# Patient Record
Sex: MALE | Race: WHITE
[De-identification: names, ages, dates, MRNs, and addresses within clinical notes are randomized per-mention and may not be internally consistent; named-entity substitution may affect disease eponyms.]

---

## 2018-09-25 ENCOUNTER — HOSPITAL ENCOUNTER (EMERGENCY)
Dept: HOSPITAL 25 - UCCORT | Age: 73
Discharge: HOME | End: 2018-09-25
Payer: COMMERCIAL

## 2018-09-25 VITALS — SYSTOLIC BLOOD PRESSURE: 117 MMHG | DIASTOLIC BLOOD PRESSURE: 72 MMHG

## 2018-09-25 DIAGNOSIS — Z96.641: ICD-10-CM

## 2018-09-25 DIAGNOSIS — Z88.8: ICD-10-CM

## 2018-09-25 DIAGNOSIS — Z79.82: ICD-10-CM
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
## 2018-09-25 DIAGNOSIS — S01.112A: Primary | ICD-10-CM

## 2018-09-25 DIAGNOSIS — Z79.2: ICD-10-CM

## 2018-09-25 DIAGNOSIS — Z79.84: ICD-10-CM

## 2018-09-25 DIAGNOSIS — M10.9: ICD-10-CM

## 2018-09-25 DIAGNOSIS — Z87.39: ICD-10-CM

## 2018-09-25 DIAGNOSIS — I10: ICD-10-CM

## 2018-09-25 DIAGNOSIS — E11.9: ICD-10-CM

## 2018-09-25 PROCEDURE — 12001 RPR S/N/AX/GEN/TRNK 2.5CM/<: CPT

## 2018-09-25 PROCEDURE — 99211 OFF/OP EST MAY X REQ PHY/QHP: CPT

## 2018-09-25 PROCEDURE — 12011 RPR F/E/E/N/L/M 2.5 CM/<: CPT

## 2018-09-25 PROCEDURE — G0463 HOSPITAL OUTPT CLINIC VISIT: HCPCS

## 2018-09-25 NOTE — XMS REPORT
Continuity of Care Document (CCD)

 Created on:2018



Patient:Jesse Colon

Sex:Male

:1945

External Reference #:[id number]





Demographics







 Address  [address] [place] [id number]

 

 Home Phone  [phone]

 

 Mobile Phone  [phone]

 

 Preferred Language  en

 

 Marital Status  Not  or 

 

 Church Affiliation  Unknown

 

 Race  White

 

 Ethnic Group  Not  or 









Author







 Name  Abisai Glover M.D.

 

 Address  [address] [place] [address]









Care Team Providers







 Name  Role  Phone

 

 Abisai Glover M.D.  Care Team Information   Unavailable









Payers







 Type  Date  Identification Numbers  Payment Provider  Subscriber

 

   Effective:  Policy Number: [id number]  [employer]'s [employer]  Jesse Colon



   2017    [employer]  









 Group Name: Medicare  [address] [address]

 

 PayID: [id number]  [place] [address]







Advance Directives







 Description

 

 No Information Available







Problems







 Date  Description  Provider  Status

 

 Onset: 2011  Type 2 diabetes mellitus  Abisai Glover M.D.  Active

 

 Onset: 2011  Essential hypertension  Abisai Glover M.D.  Active

 

 Onset: 2011  Climacteric arthritis of multiple  Abisai Glover M.D.
  Active



   sites    

 

 Onset: 2017  Gout  Abisai Glover M.D.  Active

 

 Onset: 2017  Cellulitis of left lower limb  Abisai Glover M.D.  
Active

 

 Onset: 2017  Major depressive disorder, single  Abisai Glover M.D.
  Active



   episode, unspecified    

 

 Onset: 2017  Loss of appetite  Abisai Glover M.D.  Active

 

 Onset: 2017  Incomplete rotator cuff tear or  Abisai Glover M.D.  
Active



   rupture of right shoulder, not    



   specified as traumatic    

 

 Onset: 2017  Anemia  Abisai Glover M.D.  Active

 

 Onset: 2017  Hyperlipidemia  Abisai Glover M.D.  Active

 

 Onset: 2017  Arthralgia of the pelvic region and  Abisai Glover M.D.  Active



   thigh    







Family History







 Date  Family Member(s)  Problem(s)  Comments

 

   Father   due to Premature Heart Attack  ()

 

   Father   due to Heart Disease  ()

 

   Mother   due to Heart Disease  ()

 

   Grandfather  Diabetes  







Social History







 Type  Date  Description  Comments

 

 Birth Sex    Unknown  

 

 Marital Status    Legal Status:   

 

 Lives With    Spouse  

 

 ETOH Use    Denies alcohol use  

 

 Tobacco Use  Reviewed: 17  Patient has never smoked  

 

 Smoking Status  Reviewed: 18  Patient has never smoked  







Allergies, Adverse Reactions, Alerts







 Date  Description  Reaction  Status  Severity  Comments

 

 2018  Heparin  Urticaria  Active  Severe  

 

 2011  NKDA    Inactive    







Medications







 Medication  Date  Status  Form  Strength  Qnty  SIG  Indications  Ordering



                 Provider

 

 Shingrix    Active  Suspension  50mcg  1unit  ok to have  B02.9      Rec    s  immunization    Chanellasejal



             at pharmacy    JOSE LUIS york



             and repeat    



             in 6 months    

 

 Famciclovir  05/10  Active  Tablets  500mg  14tab  1 by mouth  B02.9          s  every 12    Chanellasejal



             hours    JOSE LUIS york

 

 Allopurinol    Active  Tablets  100mg  30tab  1 by mouth  E79.0          s  every day    Calos york M.D.

 

 Metformin HCL    Active  Tablets  500mg  90tab  1 by mouth  E11.9          s  daily    Calos york M.D.

 

 Sertraline HCL    Active  Tablets  50mg  30tab  1 by mouth  F32.9          s  every day    Calos york M.D.

 

 Probiotic    Active  Capsules      use as  M71.051  Abisai



 Advanced Ultra  /2017          directed    Calos york M.D.

 

 Tylenol  05/10  Active  Tablets  325mg    2 T as q4-6              prn    Calos york M.D.

 

 Dicloxacillin  10  Active  Capsules  250mg  60cap  1 twice a    Abisai



 Sodium  /2017        s  day    Calos york M.D.

 

 Colace  02/15  Active  Capsules  100mg  90cap  3 daily  K59.00          s      Calos york M.D.

 

 Freestyle Lite    Active  Strips    100un  use two    Abisai



 Test  /2016        its  times a day    Castellasejal



             daily and as    JOSE LUIS york



             needed, dx    



             250.00, dm    

 

 Aspir-81    Active  Tablets DR  81mg    1 po qd                  Calos york M.D.

 

 Atenolol    Active  Tablets  50mg  90tab  1 tab by  I10          s  mouth every    Castellan



             day    os, M.D.

 

 Uloric    Active  Tablets  80mg    Take One    Unknown



   /0000          Tablet By    



             Mouth Every    



             Day    

 

                 

 

 Clotrimazole    Hx  Cream  1%  30gm  use 3 times  B35.4            a day    Castellan



   -              os, M.D.



                 

 

 Hydrocortisone    Hx  Cream  1%  28.40  use tid with  B35.4  Abisai



 Plus  /2018        0gm  clotrimazole    Castellan



   -              os, M.D.



                 

 

 Uloric    Hx  Tablets  80mg  30tab  1 by mouth  M10.9          s  every day    Castellan



   -              os, M.D.



                 

 

 Citalopram  08/10  Hx  Tablets  10mg    1 by mouth    



 Celinabromid          every day    Castellan



   -              os, M.D.



                 

 

 Allopurinol    Hx  Tablets  100mg  30tab  1 by mouth            s  every day    Castellan



   -              os, M.D.



                 

 

 Cipro    Hx  Tablets  500mg  20tab  1 by mouth            s  twice a day    Castellan



   -              os, M.D.



                 

 

 Colcrys    Hx  Tablets  0.6mg  30tab  2 now 1 in 1            s  hour then1    Castellan



   -          once a day    os, M.D.



                 

 

 Megestrol    Hx  Tablets  40mg  30tab  1 by mouth  R63.0  Abisai



 Acetate  /2017        s  every day    Castellan



   -              os, M.D.



   08/10              



   /2017              

 

 Mirtazapine    Hx  Tablets  7.5mg  30tab  1 every in  R63.0          s  the morning    Castellan



   -              os, M.D.



                 

 

 Citalopram  05/10  Hx  Tablets  20mg  30tab  1 by mouth    



 Hydrobromide          s  every day    Castellan



   -              os, M.D.



   08/10              



   /2017              

 

 Tramadol HCL  15  Hx  Tablets  50mg  120ta  1 by mouth  M25.551          bs  every 4 hrs    Castellan



   -          if needed    os, JONATHANDRose Mary



   08/10          for pain,    



             with one    



             tylenol each    



             time    

 

 Azithromycin    Hx  Tablets  250mg  6tabs  2 now and 1  J01.          daily x 4    Castellan



   -          days repeat    os, MRose MaryDRose Mary



             the           in 5 days    

 

 Alkalol    Hx  Solution    16Oz  use four  J01.          times a day    Castellan



   -          with    os, MNANETTE



             applicator    



   /              

 

 Meloxicam    Hx  Tablets  7.5mg  30tab  1 by mouth  M25.511          s  every day    Castellan



   -              os, M.DRose Mary



                 

 

 Meloxicam    Hx  Tablets  15mg  30tab  1 by mouth  M25.511          s  every day    Castellan



   -              os, MRose MaryDRose Mary



                 

 

 Metformin HCL    Hx  Tablets  1000mg  180ta  1/2 daily  E11.9          bs      Castellan



   -              os, M.D.



                 

 

 Glipizide XL    Hx  Tablets ER  10mg  90tab  1 by mouth  E11.    24HR    s  every day    Castellan



   -              os, JONATHANDRose Mary



   05/10              



   /2017              

 

 Lisinopril-Hydr    Hx  Tablets  20-25mg  90tab  take one  I10  



 ochlorothiazide          s  tablet by    Castellan



   -          mouth daily.    os, MRose MaryDRose Mary



   05/10              



   /2018              









 E11.9









 Janumet  2014 -  Hx  Tablets  50-1000mg  60tabs  1 bid  250.00  Abisai



   2015              JOSE LUIS Glover  2014 -  Hx        ok to    Abisai



   2014          receive    Maverick Glover    



             vaccine    

 

 Glucose Test  2014 -  Hx      100units  use twice  E11.9  Abisai



 Strips  2018          a day and    Belen



             as needed    M.D.

 

 Glucometer,  2014 -  Hx      1units  use daily    Abisai



 Generic  2014              JOSE LUIS Glover

 

 Onetouch Ultra  2013 -  Hx          250.00  Abisai



 Blue  2014              JOSE LUIS Glover

 

 Atorvastatin  05/15/2013 -  Hx  Tablets  10mg  45tabs  1 every  272.4  Abisai



 Calcium  2015          other day    JOSE LUIS Glover

 

 Glucotrol XL  2012 -  Hx  Tablets  5mg  90tabs  Stop This  E11.9  Abisai



   2017    ER 24HR      Eliot Glover M.D.

 

 Lisinopril/Hyd  2011 -  Hx  Tablets  20-25mg  90tabs  take one  401.9  
Abisai



 rochlorothiazi  2015          tablet by    Belen



 de            mouth    M.DRose Mary



             daily.    









 250.00









 Metformin HCL  2011 -  Hx  Tablets  500mg  180tabs  1 tab by    Abisai



   2011          mouth twice    Belen,



             a day    M.DRose Mary

 

 Metformin HCL  2011 -  Hx  Tablets  1000mg  180tabs  Stop This  25  
Abisai



   2015          Medicine  0.  Glover,



               00  M.D.

 

 Onetouch Ultra  2011 -  Hx  Strips    100units  use as  E1  Abisai Arzate  2016          directed to  1eliezer Bowen two  9  M.D.



             times a day    

 

 Cyclobenzaprine   -  Hx  Tablets  5mg        Unknown



 HCL  08/10/2017              

 

 Naproxen   -  Hx  Tablets  500mg        Unknown



   05/10/2017              

 

 Naproxen   -  Hx  Tablets  500mg    Take One    Unknown



   08/10/2017          Tablet By    



             Mouth Twice    



             A Day With    



             Meals    







Immunizations







 CPT Code  Status  Date  Vaccine  Reaction  Lot #

 

 00704  Given  2018  Prevnar 13    Prevnar 13 [id number]

 

 26575  Given  2015  Flu High Dose    Hi/dose/flu



       Vaccine    [id number]

 

 65208  Given  10/31/2014  Flu Triv Old Code    

 

 72016  Given  2013  Flu High Dose  risk & benefits  High doseflu



       Vaccine  discussed  [id number]

 

 19331  Given  2012  Flu High Dose    High dose [id number]



       Vaccine    

 

 78936  Given  2010  Pneumovax PPSV-23    

 

 39322  Given  2010  Tdap Vaccine over 7    



       yrs old    







Vital Signs







 Date  Vital  Result  Comment

 

 2018 10:25am  Weight  174.00 lb  









 BP Systolic  120 mmHg  

 

 BP Diastolic  72 mmHg  

 

 Height  70 inches  5'10"

 

 Heart Rate  52 /min  

 

 O2 % BldC Oximetry  99 %  ra

 

 Respiratory Rate  16 /min  

 

 BMI (Body Mass Index)  25.0 kg/m2  









 2018  7:58am  Weight  170.00 lb  









 BP Systolic  128 mmHg  

 

 BP Diastolic  82 mmHg  









 05/10/2018  1:57pm  Weight  175.00 lb  w boots









 BP Systolic  140 mmHg  

 

 BP Diastolic  80 mmHg  









 2018  8:39am  Weight  176.00 lb  









 BP Systolic  150 mmHg  

 

 BP Diastolic  70 mmHg  









 2018  2:59pm  Weight  165.00 lb  









 BP Systolic  112 mmHg  

 

 BP Diastolic  74 mmHg  

 

 Height  72 inches  6'0"

 

 BMI (Body Mass Index)  22.4 kg/m2  









 2017 10:42am  Weight  162.00 lb  









 BP Systolic  118 mmHg  

 

 BP Diastolic  84 mmHg  









 2017  8:53am  Weight  160.00 lb  sneakers









 BP Systolic  120 mmHg  

 

 BP Diastolic  78 mmHg  

 

 Height  72 inches  6'0"

 

 Heart Rate  56 /min  

 

 Respiratory Rate  16 /min  

 

 BMI (Body Mass Index)  21.7 kg/m2  









 2017 10:25am  Weight  153.50 lb  









 BP Systolic  122 mmHg  

 

 BP Diastolic  82 mmHg  









 08/10/2017 11:53am  Weight  152.00 lb  









 BP Systolic  110 mmHg  

 

 BP Diastolic  82 mmHg  









 2017  9:28am  BP Systolic  110 mmHg  









 BP Diastolic  70 mmHg  









 2017 10:59am  Weight  150.00 lb  









 BP Systolic  120 mmHg  

 

 BP Diastolic  76 mmHg  

 

 Height  72 inches  6'0"

 

 BMI (Body Mass Index)  20.3 kg/m2  









 2017  8:14am  Weight  150.00 lb  









 BP Systolic  112 mmHg  

 

 BP Diastolic  80 mmHg  









 2017 11:34am  Weight  149.50 lb  









 BP Systolic  118 mmHg  

 

 BP Diastolic  70 mmHg  









 2017 10:46am  Weight  150.00 lb  









 Height  72 inches  6'0"

 

 BMI (Body Mass Index)  20.3 kg/m2  









 2017  4:47pm  Weight  150.00 lb  









 BP Systolic  88 mmHg  

 

 BP Diastolic  66 mmHg  









 05/10/2017  3:55pm  BP Systolic  122 mmHg  









 BP Diastolic  80 mmHg  

 

 Body Temperature  98.7 F  









 02/15/2017 11:25am  BP Systolic  90 mmHg  









 BP Diastolic  60 mmHg  









 2016  8:09am  Weight  202.00 lb  









 BP Systolic  116 mmHg  

 

 BP Diastolic  70 mmHg  









 2016  9:05am  Weight  195.00 lb  









 BP Systolic  90 mmHg  

 

 BP Diastolic  70 mmHg  

 

 Height  69.75 inches  5'9.75"

 

 Heart Rate  68 /min  

 

 Body Temperature  97.4 F  

 

 Respiratory Rate  16 /min  

 

 BMI (Body Mass Index)  28.2 kg/m2  









 2016 11:01am  Weight  200.00 lb  









 BP Systolic  96 mmHg  

 

 BP Diastolic  70 mmHg  









 2016  8:57am  Weight  201.00 lb  









 BP Systolic  110 mmHg  

 

 BP Diastolic  86 mmHg  









 2015  8:52am  Weight  200.50 lb  









 BP Systolic  122 mmHg  

 

 BP Diastolic  78 mmHg  









 2015  9:02am  Weight  191.00 lb  









 BP Systolic  124 mmHg  

 

 BP Diastolic  70 mmHg  

 

 Height  69.50 inches  5'9.50"

 

 Heart Rate  60 /min  

 

 Body Temperature  97.5 F  

 

 O2 Saturation Level with Exercise  16 %  

 

 BMI (Body Mass Index)  27.8 kg/m2  









 2015  9:36am  Weight  193.00 lb  









 BP Systolic  106 mmHg  

 

 BP Diastolic  72 mmHg  









 2015  9:05am  Weight  193.50 lb  









 BP Systolic  106 mmHg  

 

 BP Diastolic  80 mmHg  









 2015 11:56am  Weight  196.50 lb  









 BP Systolic  100 mmHg  

 

 BP Diastolic  62 mmHg  









 2014 11:23am  Weight  196.00 lb  









 BP Systolic  108 mmHg  

 

 BP Diastolic  74 mmHg  









 2014  9:56am  Weight  192.00 lb  









 BP Systolic  110 mmHg  

 

 BP Diastolic  82 mmHg  

 

 Height  69.25 inches  5'9.25"

 

 Heart Rate  60 /min  

 

 Body Temperature  97.7 F  

 

 Respiratory Rate  16 /min  

 

 BMI (Body Mass Index)  28.1 kg/m2  









 2014  2:31pm  Weight  197.00 lb  









 BP Systolic  110 mmHg  

 

 BP Diastolic  70 mmHg  









 2013 12:02pm  Weight  201.50 lb  









 BP Systolic  96 mmHg  

 

 BP Diastolic  62 mmHg  









 2013  8:13am  Weight  196.00 lb  









 BP Systolic  102 mmHg  

 

 BP Diastolic  70 mmHg  

 

 Height  69.75 inches  5'9.75"

 

 Heart Rate  60 /min  

 

 Body Temperature  97.7 F  

 

 Respiratory Rate  16 /min  

 

 BMI (Body Mass Index)  28.3 kg/m2  









 05/15/2013  8:13am  Weight  196.50 lb  









 BP Systolic  120 mmHg  

 

 BP Diastolic  88 mmHg  

 

 Height  69 inches  5'9"

 

 BMI (Body Mass Index)  29.0 kg/m2  









 03/15/2013  8:08am  Weight  200.50 lb  









 BP Systolic  102 mmHg  

 

 BP Diastolic  72 mmHg  

 

 Height  69 inches  5'9"

 

 BMI (Body Mass Index)  29.6 kg/m2  









 2012  8:50am  Weight  194.00 lb  









 BP Systolic  120 mmHg  

 

 BP Diastolic  70 mmHg  

 

 Height  69 inches  5'9"

 

 BMI (Body Mass Index)  28.6 kg/m2  









 2012 11:04am  Weight  190.00 lb  









 BP Systolic  110 mmHg  

 

 BP Diastolic  64 mmHg  









 2012  8:31am  Weight  187.00 lb  









 BP Systolic  110 mmHg  

 

 BP Diastolic  70 mmHg  

 

 Height  69 inches  5'9"

 

 Heart Rate  56 /min  

 

 Body Temperature  97.7 F  

 

 Respiratory Rate  16 /min  

 

 BMI (Body Mass Index)  27.6 kg/m2  









 2012  8:18am  Weight  185.00 lb  









 BP Systolic  132 mmHg  

 

 BP Diastolic  84 mmHg  

 

 Height  69 inches  5'9"

 

 BMI (Body Mass Index)  27.3 kg/m2  









 2011  8:36am  Weight  197.00 lb  









 BP Systolic  130 mmHg  

 

 BP Diastolic  80 mmHg  







Results







 Test  Date  Facility  Test  Result  H/L  Range  Note

 

 Comprehensive  2018  [facility]  Glucose  148 mg/dL  High    
1



 Metabolic Panel    [phone]          









 BUN  27 mg/dL  High  7-18  

 

 Creatinine  1.5 mg/dL  High  0.6-1.3  

 

 Glom Filtration Rate, Estimate  49 mL/min    >60  

 

 If   59 mL/min    >60  2

 

 BUN/Creat  18.0 ratio      

 

 Sodium  139 mmol/L    136-145  

 

 Potassium  4.2 mmol/L    3.5-5.1  

 

 Chloride  107 mmol/L      

 

 Carbon Dioxide  23 mmol/L    21-32  

 

 Anion Gap  9 mEq/L    8-16  

 

 Calcium  9.5 mg/dL    8.5-10.1  

 

 Total Protein  7.9 g/dL    6.4-8.2  

 

 Albumin  3.7 g/dL    3.4-5.0  

 

 Globulin  4.2 g/dL    1.9-4.3  

 

 Alb/Glob  0.9 ratio      

 

 Bilirubin,Total  0.5 mg/dL    0.2-1.0  

 

 Sgot/Ast  12 U/L  Low  15-37  3

 

 SGPT/Alt  25 U/L    12-78  

 

 Alkaline Phosphatase  132 U/L  High    









 Microalbumin,Random  2018  [facility]  Microalbumin,Urine  11.7    
<  



 Urine    [phone]    mg/L    20.0  

 

 Glycohemoglobin A1c  2018  [facility]  Glycohemoglobin  6.9 %  
High  4.2-6  4



     [phone]  (A1c)      .3  









 eAG  151 mg/dL      









 CBC  2018  [facility]  White Blood Count  6.5 K/uL    3.4-10.5  



     [phone]          









 Red Blood Count  3.83 M/uL  Low  4.20-5.80  

 

 Hemoglobin  12.6 gm/dL  Low  12.8-17.0  

 

 Hematocrit  37.0 %  Low  38.0-48.0  

 

 Mean Cell Volume  96.6 fl  High  80.0-96.0  

 

 Mean Corpuscular HGB  32.9 pg    27.0-33.0  

 

 Mean Corpuscular HGB Conc  34.1 g/dL    31.7-36.0  

 

 Platelet Count  187 K/uL    155-360  

 

 Red Cell Distri Width %CV  12.9 %    11.6-15.8  

 

 Mean Platelet Volume  10.8 fL  High  6.6-10.6  









 Laboratory test finding  2018  [facility]  Uric Acid  6.6 mg/dL    
3.5-7.2  



     [phone]          









 Vitamin D,25-Hydroxy  <pending>      

 

 C-Reactive Protein,Cardiac  14.60 mg/L    <3.0  

 

 Sedimentation Rate  29 mm/hr  High  0-20  5









 Glycohemoglobin  2018  [facility]  Glycohemoglobin  6.8 %  High  
4.2-6.3  6, 7



 A1c    [phone]  (A1c)        









 eAG  148 mg/dL      









 Comprehensive Metabolic  2018  [facility]  Glucose  145 mg/dL  
High    



 Panel    [phone]          









 BUN  27 mg/dL  High  7-18  

 

 Creatinine  1.5 mg/dL  High  0.6-1.3  

 

 Glom Filtration Rate, Estimate  49 mL/min    >60  

 

 If   59 mL/min    >60  8

 

 BUN/Creat  18.0 ratio      

 

 Sodium  140 mmol/L    136-145  

 

 Potassium  4.7 mmol/L    3.5-5.1  

 

 Chloride  106 mmol/L      

 

 Carbon Dioxide  25 mmol/L    21-32  

 

 Anion Gap  9 mEq/L    8-16  

 

 Calcium  9.4 mg/dL    8.5-10.1  

 

 Total Protein  7.7 g/dL    6.4-8.2  

 

 Albumin  3.8 g/dL    3.4-5.0  

 

 Globulin  3.9 g/dL    1.9-4.3  

 

 Alb/Glob  1.0 ratio      

 

 Bilirubin,Total  0.7 mg/dL    0.2-1.0  

 

 Sgot/Ast  10 U/L  Low  15-37  9

 

 SGPT/Alt  23 U/L    12-78  

 

 Alkaline Phosphatase  91 U/L      









 Laboratory test  2018  [facility]  Uric Acid  6.5 mg/dL    3.5-7.2
  



 finding    [phone]          

 

 CBS W/Automated  2018  [facility]  White Blood  5.5 K/uL    3.4-
10.5  10



 Diff    [phone]  Count        









 Red Blood Count  4.01 M/uL  Low  4.20-5.80  

 

 Hemoglobin  13.3 gm/dL    12.8-17.0  

 

 Hematocrit  39.3 %    38.0-48.0  

 

 Mean Cell Volume  98.0 fl  High  80.0-96.0  

 

 Mean Corpuscular HGB  33.2 pg  High  27.0-33.0  

 

 Mean Corpuscular HGB Conc  33.8 g/dL    31.7-36.0  

 

 Platelet Count  154 K/uL  Low  155-360  

 

 Red Cell Distri Width SD  46.1 fl    36-51  

 

 Red Cell Distri Width %CV  13.2 %    11.6-15.8  

 

 Mean Platelet Volume  10.9 fL  High  6.6-10.6  

 

 Neut%  56.3 %    33.0-73.0  

 

 Lymph %  27.3 %    20.0-42.0  

 

 Mono %  8.2 %    0.0-10.0  

 

 Eo%  7.5 %  High  0.0-6.6  

 

 Bas%  0.7 %    0.0-1.1  

 

 Neut#  3.09 K/uL    1.8-7.0  

 

 Lymph #  1.50 K/uL    1.0-4.0  

 

 Mono #  0.45 K/uL    0.0-0.8  

 

 Eos #  0.41 K/uL    0.0-0.5  

 

 Baso #  0.04 K/uL    0.0-0.1  









 Laboratory test  2018  [facility]  Sedimentation  13 mm/hr    0-20
  11



 finding    [phone]  Rate        

 

 Comprehensive  2018  [facility]  Glucose  142  High    



 Metabolic Panel    [phone]    mg/dL      









 BUN  28 mg/dL  High  7-18  

 

 Creatinine  1.6 mg/dL  High  0.6-1.3  

 

 Glom Filtration Rate, Estimate  45 mL/min    >60  

 

 If   55 mL/min    >60  12

 

 BUN/Creat  17.5 ratio      

 

 Sodium  141 mmol/L    136-145  

 

 Potassium  4.5 mmol/L    3.5-5.1  

 

 Chloride  108 mmol/L  High    

 

 Carbon Dioxide  26 mmol/L    21-32  

 

 Anion Gap  7 mEq/L  Low  8-16  

 

 Calcium  9.6 mg/dL    8.5-10.1  

 

 Total Protein  7.9 g/dL    6.4-8.2  

 

 Albumin  4.0 g/dL    3.4-5.0  

 

 Globulin  3.9 g/dL    1.9-4.3  

 

 Alb/Glob  1.0 ratio      

 

 Bilirubin,Total  0.4 mg/dL    0.2-1.0  

 

 Sgot/Ast  11 U/L  Low  15-37  13

 

 SGPT/Alt  18 U/L    12-78  

 

 Alkaline Phosphatase  99 U/L      









 Laboratory test  2018  [facility]  C-Reactive  4.91    <3.0  



 finding    [phone]  Protein,Cardiac  mg/L      

 

 Laboratory test  2018  [facility]  Uric Acid  8.0  High  3.5-7.  14
,



 finding    [phone]    mg/dL    2  15

 

 Glycohemoglobin  2018  [facility]  Glycohemoglobin  6.4 %  High  
4.2-6.  16



 A1c    [phone]  (A1c)      3  









 eAG  137 mg/dL      









 Microalbumin,Random  2018  [facility]  Microalbumin,Urine  17.9    
< 20.0  



 Urine    [phone]    mg/L      

 

 Comprehensive  2018  [facility]  Glucose  161  High    



 Metabolic Panel    [phone]    mg/dL      









 BUN  31 mg/dL  High  7-18  

 

 Creatinine  1.6 mg/dL  High  0.6-1.3  

 

 Glom Filtration Rate, Estimate  45 mL/min    >60  

 

 If   55 mL/min    >60  17

 

 BUN/Creat  19.3 ratio      

 

 Sodium  138 mmol/L    136-145  

 

 Potassium  4.4 mmol/L    3.5-5.1  

 

 Chloride  106 mmol/L      

 

 Carbon Dioxide  26 mmol/L    21-32  

 

 Anion Gap  6 mEq/L  Low  8-16  

 

 Calcium  9.8 mg/dL    8.5-10.1  

 

 Total Protein  8.0 g/dL    6.4-8.2  

 

 Albumin  4.0 g/dL    3.4-5.0  

 

 Globulin  4.0 g/dL    1.9-4.3  

 

 Alb/Glob  1.0 ratio      

 

 Bilirubin,Total  0.5 mg/dL    0.2-1.0  

 

 Sgot/Ast  11 U/L  Low  15-37  18

 

 SGPT/Alt  20 U/L    12-78  

 

 Alkaline Phosphatase  101 U/L      









 Laboratory test  2018  [facility]  Vitamin  60.4    30.0-100.0  19



 finding    [phone]  D,25-Hydroxy  ng/mL      

 

 CBS W/Automated  2018  [facility]  White Blood Count  5.7 K/uL    
3.4-10.5  



 Diff    [phone]          









 Red Blood Count  4.12 M/uL  Low  4.20-5.80  

 

 Hemoglobin  13.5 gm/dL    12.8-17.0  

 

 Hematocrit  38.9 %    38.0-48.0  

 

 Mean Cell Volume  94.4 fl    80.0-96.0  

 

 Mean Corpuscular HGB  32.8 pg    27.0-33.0  

 

 Mean Corpuscular HGB Conc  34.7 g/dL    31.7-36.0  

 

 Platelet Count  179 K/uL    155-360  

 

 Red Cell Distri Width SD  43.0 fl    36-51  

 

 Red Cell Distri Width %CV  12.7 %    11.6-15.8  

 

 Mean Platelet Volume  10.5 fL    6.6-10.6  

 

 Neut%  58.7 %    33.0-73.0  

 

 Lymph %  28.5 %    20.0-42.0  

 

 Mono %  6.3 %    0.0-10.0  

 

 Eo%  6.0 %    0.0-6.6  

 

 Bas%  0.5 %    0.0-1.1  

 

 Neut#  3.33 K/uL    1.8-7.0  

 

 Lymph #  1.62 K/uL    1.0-4.0  

 

 Mono #  0.36 K/uL    0.0-0.8  

 

 Eos #  0.34 K/uL    0.0-0.5  

 

 Baso #  0.03 K/uL    0.0-0.1  









 Laboratory test  2018  [facility]  C-Reactive  7.84 mg/L    <3.0  
20



 finding    [phone]  Protein,Cardiac        









 Sedimentation Rate  22 mm/hr  High  0-20  21









 Comprehensive Metabolic  2017  [facility]  Glucose  126 mg/dL  
High    22



 Panel    [phone]          









 BUN  33 mg/dL  High  7-18  

 

 Creatinine  1.6 mg/dL  High  0.6-1.3  

 

 Glom Filtration Rate, Estimate  45 mL/min    >60  

 

 If   55 mL/min    >60  23

 

 BUN/Creat  20.6 ratio      

 

 Sodium  138 mmol/L    136-145  

 

 Potassium  4.5 mmol/L    3.5-5.1  

 

 Chloride  106 mmol/L      

 

 Carbon Dioxide  26 mmol/L    21-32  

 

 Anion Gap  6 mEq/L  Low  8-16  

 

 Calcium  9.7 mg/dL    8.5-10.1  

 

 Total Protein  7.8 g/dL    6.4-8.2  

 

 Albumin  3.9 g/dL    3.4-5.0  

 

 Globulin  3.9 g/dL    1.9-4.3  

 

 Alb/Glob  1.0 ratio      

 

 Bilirubin,Total  0.6 mg/dL    0.2-1.0  

 

 Sgot/Ast  12 U/L  Low  15-37  24

 

 SGPT/Alt  19 U/L    12-78  

 

 Alkaline Phosphatase  106 U/L      









 Laboratory test  2017  [facility]  C-Reactive  3.23 mg/L    <3.0  
25



 finding    [phone]  Protein,Cardiac        

 

 CBS W/Automated  2017  [facility]  White Blood Count  7.3 K/uL    
3.4-10.5  



 Diff    [phone]          









 Red Blood Count  4.02 M/uL  Low  4.20-5.80  

 

 Hemoglobin  13.3 gm/dL    12.8-17.0  

 

 Hematocrit  37.8 %  Low  38.0-48.0  

 

 Mean Cell Volume  94.0 fl    80.0-96.0  

 

 Mean Corpuscular HGB  33.1 pg  High  27.0-33.0  

 

 Mean Corpuscular HGB Conc  35.2 g/dL    31.7-36.0  

 

 Platelet Count  184 K/uL    155-360  

 

 Red Cell Distri Width SD  43.8 fl    36-51  

 

 Red Cell Distri Width %CV  13.2 %    11.6-15.8  

 

 Mean Platelet Volume  11.0 fL  High  6.6-10.6  

 

 Neut%  65.6 %    33.0-73.0  

 

 Lymph %  22.0 %    20.0-42.0  

 

 Mono %  7.1 %    0.0-10.0  

 

 Eo%  4.8 %    0.0-6.6  

 

 Bas%  0.5 %    0.0-1.1  

 

 Neut#  4.80 K/uL    1.8-7.0  

 

 Lymph #  1.61 K/uL    1.0-4.0  

 

 Mono #  0.52 K/uL    0.0-0.8  

 

 Eos #  0.35 K/uL    0.0-0.5  

 

 Baso #  0.04 K/uL    0.0-0.1  









 Laboratory test  2017  [facility]  Sedimentation  13 mm/hr    0-20
  26



 finding    [phone]  Rate        

 

 Comprehensive  2017  [facility]  Glucose  143  High    



 Metabolic Panel    [phone]    mg/dL      









 BUN  29 mg/dL  High  7-18  

 

 Creatinine  1.5 mg/dL  High  0.6-1.3  

 

 Glom Filtration Rate, Estimate  49 mL/min    >60  

 

 If   59 mL/min    >60  27

 

 BUN/Creat  19.3 ratio      

 

 Sodium  139 mmol/L    136-145  

 

 Potassium  4.2 mmol/L    3.5-5.1  

 

 Chloride  106 mmol/L      

 

 Carbon Dioxide  26 mmol/L    21-32  

 

 Anion Gap  7 mEq/L  Low  8-16  

 

 Calcium  8.8 mg/dL    8.5-10.1  

 

 Total Protein  7.5 g/dL    6.4-8.2  

 

 Albumin  3.6 g/dL    3.4-5.0  

 

 Globulin  3.9 g/dL    1.9-4.3  

 

 Alb/Glob  0.9 ratio      

 

 Bilirubin,Total  0.6 mg/dL    0.2-1.0  

 

 Sgot/Ast  10 U/L  Low  15-37  28

 

 SGPT/Alt  21 U/L    12-78  

 

 Alkaline Phosphatase  100 U/L      









 Comprehensive Metabolic  2017  [facility]  Glucose  144 mg/dL  
High    



 Panel    [phone]          









 BUN  27 mg/dL  High  7-18  

 

 Creatinine  1.6 mg/dL  High  0.6-1.3  

 

 Glom Filtration Rate, Estimate  45 mL/min    >60  

 

 If   55 mL/min    >60  29

 

 BUN/Creat  16.8 ratio      

 

 Sodium  138 mmol/L    136-145  

 

 Potassium  4.3 mmol/L    3.5-5.1  

 

 Chloride  105 mmol/L      

 

 Carbon Dioxide  25 mmol/L    21-32  

 

 Anion Gap  8 mEq/L    8-16  

 

 Calcium  9.5 mg/dL    8.5-10.1  

 

 Total Protein  8.0 g/dL    6.4-8.2  

 

 Albumin  3.9 g/dL    3.4-5.0  

 

 Globulin  4.1 g/dL    1.9-4.3  

 

 Alb/Glob  1.0 ratio      

 

 Bilirubin,Total  0.7 mg/dL    0.2-1.0  

 

 Sgot/Ast  12 U/L  Low  15-37  30

 

 SGPT/Alt  24 U/L    12-78  

 

 Alkaline Phosphatase  103 U/L      









 Comprehensive Metabolic  2017  [facility]  Glucose  141 mg/dL  
High    31



 Panel    [phone]          









 BUN  30 mg/dL  High  7-18  

 

 Creatinine  1.6 mg/dL  High  0.6-1.3  

 

 Glom Filtration Rate, Estimate  45 mL/min    >60  

 

 If   55 mL/min    >60  32

 

 BUN/Creat  18.7 ratio      

 

 Sodium  139 mmol/L    136-145  

 

 Potassium  4.2 mmol/L    3.5-5.1  

 

 Chloride  108 mmol/L  High    

 

 Carbon Dioxide  26 mmol/L    21-32  

 

 Anion Gap  5 mEq/L  Low  8-16  

 

 Calcium  9.8 mg/dL    8.5-10.1  

 

 Total Protein  8.1 g/dL    6.4-8.2  

 

 Albumin  3.9 g/dL    3.4-5.0  

 

 Globulin  4.2 g/dL    1.9-4.3  

 

 Alb/Glob  0.9 ratio      

 

 Bilirubin,Total  0.5 mg/dL    0.2-1.0  

 

 Sgot/Ast  12 U/L  Low  15-37  33

 

 SGPT/Alt  24 U/L    12-78  

 

 Alkaline Phosphatase  111 U/L      









 Laboratory test  2017  [facility]  Uric Acid  8.7 mg/dL  High  3.5-
7.2  



 finding    [phone]          









 C-Reactive Protein,Quant  3.5 mg/L  High  <3.0  









 Glycohemoglobin  2017  [facility]  Glycohemoglobin  6.5 %  High  
4.2-6.3  34



 A1c    [phone]  (A1c)        









 eAG  140 mg/dL      









 Laboratory  2017  [facility]  Microalbumin,Random  9.5 mg/L    < 
20.0  



 test finding    [phone]  Urine        

 

 CBS  2017  [facility]  White Blood Count  6.6 K/uL    3.4-10.5  



 W/Automated    [phone]          



 Diff              









 Red Blood Count  4.27 M/uL    4.20-5.80  

 

 Hemoglobin  13.9 gm/dL    12.8-17.0  

 

 Hematocrit  41.0 %    38.0-48.0  

 

 Mean Cell Volume  96.0 fl    80.0-96.0  

 

 Mean Corpuscular HGB  32.6 pg    27.0-33.0  

 

 Mean Corpuscular HGB Conc  33.9 g/dL    31.7-36.0  

 

 Platelet Count  183 K/uL    150-400  

 

 Red Cell Distri Width SD  46.8 fl    36-51  

 

 Red Cell Distri Width %CV  13.8 %    11.6-15.8  

 

 Mean Platelet Volume  11.4 fL  High  6.6-10.6  

 

 Neut%  58.3 %    33.0-73.0  

 

 Lymph %  28.1 %    20.0-42.0  

 

 Mono %  6.8 %    0.0-10.0  

 

 Eo%  6.2 %    0.0-6.6  

 

 Bas%  0.6 %    0.0-1.1  

 

 Neut#  3.85 K/uL    1.8-7.0  

 

 Lymph #  1.86 K/uL    1.0-4.0  

 

 Mono #  0.45 K/uL    0.0-0.8  

 

 Eos #  0.41 K/uL    0.0-0.5  

 

 Baso #  0.04 K/uL    0.0-0.1  









 Laboratory test  2017  [facility]  Sedimentation  11 mm/hr    0-20
  35



 finding    [phone]  Rate        

 

 Comprehensive  10/13/2017  [facility]  Glucose  130  High    36



 Metabolic Panel    [phone]    mg/dL      









 BUN  30 mg/dL  High  7-18  

 

 Creatinine  1.3 mg/dL    0.6-1.3  

 

 Glom Filtration Rate, Estimate  58 mL/min    >60  

 

 If African American  >60 mL/min    >60  37

 

 BUN/Creat  23.0 ratio      

 

 Sodium  141 mmol/L    136-145  

 

 Potassium  4.6 mmol/L    3.5-5.1  

 

 Chloride  109 mmol/L  High    

 

 Carbon Dioxide  26 mmol/L    21-32  

 

 Anion Gap  6 mEq/L  Low  8-16  

 

 Calcium  9.7 mg/dL    8.5-10.1  

 

 Total Protein  7.9 g/dL    6.4-8.2  

 

 Albumin  3.8 g/dL    3.4-5.0  

 

 Globulin  4.1 g/dL    1.9-4.3  

 

 Alb/Glob  0.9 ratio      

 

 Bilirubin,Total  0.4 mg/dL    0.2-1.0  

 

 Sgot/Ast  11 U/L  Low  15-37  38

 

 SGPT/Alt  17 U/L    12-78  

 

 Alkaline Phosphatase  107 U/L      









 Laboratory test  10/13/2017  [facility]  C-Reactive  4.05 mg/L    <3.0  
39



 finding    [phone]  Protein,Cardiac        

 

 CBS W/Automated  10/13/2017  [facility]  White Blood Count  6.6 K/uL    
3.4-10.5  



 Diff    [phone]          









 Red Blood Count  3.82 M/uL  Low  4.20-5.80  

 

 Hemoglobin  12.4 gm/dL  Low  12.8-17.0  

 

 Hematocrit  36.9 %  Low  38.0-48.0  

 

 Mean Cell Volume  96.6 fl  High  80.0-96.0  

 

 Mean Corpuscular HGB  32.5 pg    27.0-33.0  

 

 Mean Corpuscular HGB Conc  33.6 g/dL    31.7-36.0  

 

 Platelet Count  182 K/uL    150-400  

 

 Red Cell Distri Width SD  49.9 fl    36-51  

 

 Red Cell Distri Width %CV  14.6 %    11.6-15.8  

 

 Mean Platelet Volume  11.0 fL  High  6.6-10.6  

 

 Neut%  52.7 %    33.0-73.0  

 

 Lymph %  31.2 %    20.0-42.0  

 

 Mono %  7.0 %    0.0-10.0  

 

 Eo%  8.5 %  High  0.0-6.6  

 

 Bas%  0.6 %    0.0-1.1  

 

 Neut#  3.48 K/uL    1.8-7.0  

 

 Lymph #  2.06 K/uL    1.0-4.0  

 

 Mono #  0.46 K/uL    0.0-0.8  

 

 Eos #  0.56 K/uL  High  0.0-0.5  

 

 Baso #  0.04 K/uL    0.0-0.1  









 Laboratory test  10/13/2017  [facility]  Sedimentation  21 mm/hr  High  0
-20  40



 finding    [phone]  Rate        

 

 Laboratory test  2017  [facility]  Uric Acid  3.5 mg/dL    3.5-7.2
  41



 finding    [phone]          









 Sedimentation Rate  8 mm/hr    0-20  42









 Laboratory test  2017  [facility]  Uric Acid  8.7 mg/dL  High  3.5-
7.2  



 finding    [phone]          









 Sedimentation Rate  66 mm/hr  High  0-20  43









 CBS W/Automated Diff  2017  [facility]  White Blood  5.6 K/uL    
3.4-10.5  



     [phone]  Count        









 Red Blood Count  3.65 M/uL  Low  4.20-5.80  

 

 Hemoglobin  11.1 gm/dL  Low  12.8-17.0  

 

 Hematocrit  34.0 %  Low  38.0-48.0  

 

 Mean Cell Volume  93.2 fl    80.0-96.0  

 

 Mean Corpuscular HGB  30.4 pg    27.0-33.0  

 

 Mean Corpuscular HGB Conc  32.6 g/dL    31.7-36.0  

 

 Platelet Count  198 K/uL    150-400  

 

 Red Cell Distri Width SD  48.2 fl    36-51  

 

 Red Cell Distri Width %CV  14.7 %    11.6-15.8  

 

 Mean Platelet Volume  11.1 fL  High  6.6-10.6  

 

 Neut%  51.6 %    33.0-73.0  

 

 Lymph %  27.9 %    20.0-42.0  

 

 Mono %  7.5 %    0.0-10.0  

 

 Eo%  12.5 %  High  0.0-6.6  

 

 Bas%  0.5 %    0.0-1.1  

 

 Neut#  2.90 K/uL    1.8-7.0  

 

 Lymph #  1.57 K/uL    1.0-4.0  

 

 Mono #  0.42 K/uL    0.0-0.8  

 

 Eos #  0.70 K/uL  High  0.0-0.5  

 

 Baso #  0.03 K/uL    0.0-0.1  









 CBC  2017  [facility]  White Blood Count  7.1 K/uL    3.4-10.5  44



     [phone]          









 Red Blood Count  3.57 M/uL  Low  4.20-5.80  

 

 Hemoglobin  11.0 gm/dL  Low  12.8-17.0  

 

 Hematocrit  33.3 %  Low  38.0-48.0  

 

 Mean Cell Volume  93.3 fl    80.0-96.0  

 

 Mean Corpuscular HGB  30.8 pg    27.0-33.0  

 

 Mean Corpuscular HGB Conc  33.0 g/dL    31.7-36.0  

 

 Platelet Count  194 K/uL    150-400  

 

 Red Cell Distri Width %CV  14.7 %    11.6-15.8  

 

 Mean Platelet Volume  10.7 fL  High  6.6-10.6  









 Laboratory test  2017  [facility]  Uric Acid  7.8 mg/dL  High  3.5-
7.2  



 finding    [phone]          

 

 CBS W/Automated  2017  [facility]  White Blood  7.1 K/uL    3.4-
10.5  45



 Diff    [phone]  Count        









 Red Blood Count  3.07 M/uL  Low  4.20-5.80  

 

 Hemoglobin  9.2 gm/dL  Low  12.8-17.0  

 

 Hematocrit  29.1 %  Low  38.0-48.0  

 

 Mean Cell Volume  94.8 fl    80.0-96.0  

 

 Mean Corpuscular HGB  30.0 pg    27.0-33.0  

 

 Mean Corpuscular HGB Conc  31.6 g/dL  Low  31.7-36.0  

 

 Platelet Count  307 K/uL    150-400  

 

 Red Cell Distri Width SD  55.6 fl  High  36-51  

 

 Red Cell Distri Width %CV  16.7 %  High  11.6-15.8  

 

 Mean Platelet Volume  10.5 fL    6.6-10.6  

 

 Neut%  60.5 %    33.0-73.0  

 

 Lymph %  25.1 %    20.0-42.0  

 

 Mono %  6.6 %    0.0-10.0  

 

 Eo%  7.1 %  High  0.0-6.6  

 

 Bas%  0.7 %    0.0-1.1  

 

 Neut#  4.28 K/uL    1.8-7.0  

 

 Lymph #  1.78 K/uL    1.0-4.0  

 

 Mono #  0.47 K/uL    0.0-0.8  

 

 Eos #  0.50 K/uL    0.0-0.5  

 

 Baso #  0.05 K/uL    0.0-0.1  









 Comprehensive Metabolic  2017  [facility]  Glucose  160 mg/dL  
High    



 Panel    [phone]          









 BUN  26 mg/dL  High  7-18  

 

 Creatinine  1.3 mg/dL    0.6-1.3  

 

 Glom Filtration Rate, Estimate  58 mL/min    >60  

 

 If African American  >60 mL/min    >60  46

 

 BUN/Creat  20.0 ratio      

 

 Sodium  135 mmol/L  Low  136-145  

 

 Potassium  4.3 mmol/L    3.5-5.1  

 

 Chloride  95 mmol/L  Low    

 

 Carbon Dioxide  28 mmol/L    21-32  

 

 Anion Gap  12 mEq/L    8-16  

 

 Calcium  10.0 mg/dL    8.5-10.1  

 

 Total Protein  7.6 g/dL    6.4-8.2  

 

 Albumin  2.9 g/dL  Low  3.4-5.0  

 

 Globulin  4.7 g/dL  High  1.9-4.3  

 

 Alb/Glob  0.6 ratio      

 

 Bilirubin,Total  0.3 mg/dL    0.2-1.0  

 

 Sgot/Ast  16 U/L    15-37  

 

 SGPT/Alt  24 U/L    12-78  

 

 Alkaline Phosphatase  122 U/L  High    









 Basic Metabolic  2017  [facility]  Sodium  130 
mmol/L  Low  133-145  



 Panel    [phone]          









 Potassium  4.0 mmol/L    3.5-5.0  

 

 Chloride  94 mmol/L  Low  101-111  

 

 Co2 Carbon Dioxide  27 mmol/L    22-32  

 

 Anion Gap  9 mmol/L    2-11  

 

 Glucose  174 mg/dL  High    

 

 Blood Urea Nitrogen  22 mg/dL    6-24  

 

 Creatinine  1.12 mg/dL    0.67-1.17  

 

 BUN/Creatinine Ratio  19.6    8-20  

 

 Calcium  9.5 mg/dL    8.6-10.3  

 

 Egfr Non-  64.6    >60  

 

 Egfr   83.1    >60  47









 CBC [facility] Diff  [facility]  White Blood  6.2 
10^3/uL    3.5-10.8  



     [phone]  Count        









 Red Blood Count  2.94 10^6/uL  Low  4.0-5.4  

 

 Hemoglobin  8.6 g/dL  Low  14.0-18.0  

 

 Hematocrit  26 %  Low  42-52  

 

 Mean Corpuscular Volume  89 fL    80-94  

 

 Mean Corpuscular Hemoglobin  29 pg    27-31  

 

 Mean Corpuscular HGB Conc  33 g/dL    31-36  

 

 Red Cell Distribution Width  17 %  High  10.5-15  

 

 Platelet Count  274 10^3/uL    150-450  

 

 Mean Platelet Volume  9 um3    7.4-10.4  

 

 Abs Neutrophils  4.0 10^3/uL    1.5-7.7  

 

 Abs Lymphocytes  1.4 10^3/uL    1.0-4.8  

 

 Abs Monocytes  0.5 10^3/uL    0-0.8  

 

 Abs Eosinophils  0.3 10^3/uL    0-0.6  

 

 Abs Basophils  0.1 10^3/uL    0-0.2  

 

 Abs Nucleated RBC  0 10^3/uL      

 

 Granulocyte %  64.0 %    38-83  

 

 Lymphocyte %  21.7 %  Low  25-47  

 

 Monocyte %  8.0 %    1-9  

 

 Eosinophil %  5.3 %    0-6  

 

 Basophil %  1.0 %    0-2  

 

 Nucleated Red Blood Cells %  0.1      









 [facility] test  [facility]  TSH (Thyroid  
0.39    0.34-5.60  



 finding    [phone]  Stimulating  mcIU/mL      



       Horm)        

 

 Liver Function  [facility]  Total Protein  
7.6 g/dL    6.4-8.9  



 Panel    [phone]          









 Albumin  3.2 g/dL    3.2-5.2  

 

 Globulin  4.4 g/dL  High  2-4  

 

 Albumin/Globulin Ratio  0.7  Low  1-3  

 

 Total Bilirubin  0.30 mg/dL    0.2-1.0  

 

 Direct Bilirubin  0.10 mg/dL    0.03-0.18  

 

 Indirect Bilirubin  0.2 mg/dL  Low  0.3-1.0  

 

 Alkaline Phosphatase  114 U/L  High    

 

 Alt  13 U/L    7-52  

 

 Ast  15 U/L    13-39  









 Laboratory test  2017  [facility]  Hemoglobin A1c
  6.2 %  High  Less than  48



 finding    [phone]        6.0  









 Vitamin B12  248 pg/mL    180-914  49

 

 Vitamin D Total 25(Oh)  31.7 ng/mL    30-50  









 Blood Culture  2017  [facility]  Blood Culture  GRAM POSITIVE CO  
    50, 51



     [phone]  Aerobic  <SEE NOTE>      









 Quantity  FROM [facility]      

 

 Blood Culture Anaerobic  Test not perform <SEE NOTE>      52









 Ast-GP67  2017  [facility]  Penicillin G  0.06      



     [phone]          









 Oxacillin  <=0.25      

 

 Tetracycline  <=1      

 

 Trimethoprim/Sulfamethoxazole  <=10      

 

 Gentamicin  <=0.5      

 

 Erythromycin  <=0.25      

 

 Clindamycin  <=0.25      

 

 Ciprofloxacin  <=0.5      

 

 Moxifloxacin  <=0.25      

 

 Levofloxacin  0.25      

 

 Vancomycin  <=0.5      









 Ua RFX Micro & Culture II  2017  [facility]  Urine Color  YELLOW  
  Yellow  



     [phone]          









 Urine Clarity  SL CLOUDY    Clear  

 

 Urine Glucose - Dipstick  NEGATIVE mg/dL    Negative  

 

 Urine Bilirubin - Dipstick  NEGATIVE    Negative  

 

 Urine Ketone  TRACE mg/dL  High  Negative  

 

 Urine Specific Gravity  1.025    1.010-1.030  

 

 Urine Blood  TRACE    Negative  

 

 Urine PH  5.0  Low  6.5-7.5  

 

 Urine Protein - Dipstick  TRACE mg/dL    Negative  

 

 Urine Urobilinogen - Dipstick  0.2 E.U./dL    0.2-1.0  

 

 Urine Nitrite - Dipstick  NEGATIVE    Negative  

 

 Urine Leuk Esterase  NEGATIVE    Negative  

 

 Source:  URINE, CLEAN CAT <SEE NOTE>      53









 Comprehensive Metabolic  2017  [facility]  Glucose  152 mg/dL  
High    



 Panel    [phone]          









 BUN  87 mg/dL  High  7-18  

 

 Creatinine  6.4 mg/dL  High  0.6-1.3  

 

 Glom Filtration Rate, Estimate  9 mL/min    >60  

 

 If   11 mL/min    >60  54

 

 BUN/Creat  13.5 ratio      

 

 Sodium  130 mmol/L  Low  136-145  

 

 Potassium  5.7 mmol/L  High  3.5-5.1  

 

 Chloride  96 mmol/L  Low    

 

 Carbon Dioxide  19 mmol/L  Low  21-32  

 

 Anion Gap  15 mEq/L    8-16  

 

 Calcium  8.3 mg/dL  Low  8.5-10.1  

 

 Total Protein  5.9 g/dL  Low  6.4-8.2  

 

 Albumin  1.7 g/dL  Low  3.4-5.0  

 

 Globulin  4.2 g/dL    1.9-4.3  

 

 Alb/Glob  0.4 ratio      

 

 Bilirubin,Total  1.3 mg/dL  High  0.2-1.0  

 

 Sgot/Ast  23 U/L    15-37  

 

 SGPT/Alt  18 U/L    12-78  

 

 Alkaline Phosphatase  125 U/L  High    









 Laboratory test finding  2017  [facility]  CK  57 U/L      



     [phone]          









 Troponin-I  < 0.015 ng/mL      55









 Laboratory test  2017  [facility]  Lactic Acid  2.3 mmol/L  High  
0.4-1.9  56



 finding    [phone]          

 

 CBS W/Automated  2017  [facility]  White Blood  5.1 K/uL    3.4-
10.5  



 Diff    [phone]  Count        









 Red Blood Count  3.12 M/uL  Low  4.20-5.80  

 

 Hemoglobin  9.5 gm/dL  Low  12.8-17.0  

 

 Hematocrit  29.5 %  Low  38.0-48.0  

 

 Mean Cell Volume  94.6 fl    80.0-96.0  

 

 Mean Corpuscular HGB  30.4 pg    27.0-33.0  

 

 Mean Corpuscular HGB Conc  32.2 g/dL    31.7-36.0  

 

 Platelet Count  168 K/uL    150-400  

 

 Red Cell Distri Width SD  51.8 fl  High  36-51  

 

 Red Cell Distri Width %CV  15.6 %    11.6-15.8  

 

 Mean Platelet Volume  10.9 fL  High  6.6-10.6  57

 

 Neut#  4.55 K/uL    1.8-7.0  

 

 Lymph #  0.28 K/uL  Low  1.0-4.0  

 

 Mono #  0.19 K/uL    0.0-0.8  

 

 Eos #  0.04 K/uL    0.0-0.5  

 

 Baso #  0.01 K/uL    0.0-0.1  









 Laboratory test finding  2017  [facility]  Slide Review  DIFF 
ORDERED      58



     [phone]          









 Path Review:  INDICATED,SLIDE  <SEE NOTE>      59









 Differential-WBC Confirm  2017  [facility]  Total Cells  100 #
CELLS      



     [phone]  Counted        









 Metamyelocyte%  1 %  High  -0  

 

 Band%  39 %  High  0-8  

 

 Neutrophils%  48 %    33-73  

 

 Lymph%  5 %  Low  20-42  

 

 Atypical Lymph%  1 %    0-7  

 

 Monocyte%  6 %    0-10  

 

 Platelet Estimate  NORMAL      

 

 Poikilocytosis  1+      

 

 Schistocytes  0-1+      

 

 Toxic Granulation  0-1+      

 

 Dohle Bodies  0-1+      

 

 Shamar Cells  1+      

 

 Acanthocytes  0-1+      









 Blood Culture  2017  [facility]  Blood Culture  GRAM POSITIVE CO  
    60



     [phone]  Aerobic  <SEE NOTE>      









 Quantity  FROM [facility]      

 

 Blood Culture Anaerobic  GRAM POSITIVE CO <SEE NOTE>      61

 

 Blood Culture Anaerobic  STAPHYLOCOCCUS A <SEE NOTE>      62

 

 Quantity  FROM [facility]      









 Ast-[id number]  2017  [facility]  Penicillin G  0.06      



     [phone]          









 Oxacillin  <=0.25      

 

 Tetracycline  <=1      

 

 Trimethoprim/Sulfamethoxazole  <=10      

 

 Gentamicin  <=0.5      

 

 Erythromycin  <=0.25      

 

 Clindamycin  <=0.25      

 

 Ciprofloxacin  <=0.5      

 

 Moxifloxacin  <=0.25      

 

 Levofloxacin  0.25      

 

 Vancomycin  <=0.5      









 CBC  2017  [facility]  White Blood Count  7.3 K/uL    3.4-10.5  63



     [phone]          









 Red Blood Count  2.98 M/uL  Low  4.20-5.80  

 

 Hemoglobin  9.2 gm/dL  Low  12.8-17.0  

 

 Hematocrit  28.7 %  Low  38.0-48.0  

 

 Mean Cell Volume  96.3 fl  High  80.0-96.0  

 

 Mean Corpuscular HGB  30.9 pg    27.0-33.0  

 

 Mean Corpuscular HGB Conc  32.1 g/dL    31.7-36.0  

 

 Platelet Count  467 K/uL  High  150-400  

 

 Red Cell Distri Width %CV  14.3 %    11.6-15.8  

 

 Mean Platelet Volume  10.3 fL    6.6-10.6  









 Basic Metabolic Panel  2017  [facility]  Glucose  92 mg/dL    74-
106  



     [phone]          









 BUN  31 mg/dL  High  7-18  

 

 Creatinine  1.2 mg/dL    0.6-1.3  

 

 Glom Filtration Rate, Estimate  >60 mL/min    >60  

 

 If African American  >60 mL/min    >60  64

 

 BUN/Creat  25.8 ratio      

 

 Sodium  142 mmol/L    136-145  

 

 Potassium  4.1 mmol/L    3.5-5.1  

 

 Chloride  108 mmol/L  High    

 

 Carbon Dioxide  20 mmol/L  Low  21-32  

 

 Anion Gap  14 mEq/L    8-16  

 

 Calcium  9.2 mg/dL    8.5-10.1  









 Laboratory test  2017  [facility]  Glucose,Bedside  109 mg/dL    70
-110  65



 finding    [phone]          

 

 CBC  2017  [facility]  White Blood Count  6.3 K/uL    3.4-10.5  



     [phone]          









 Red Blood Count  2.83 M/uL  Low  4.20-5.80  

 

 Hemoglobin  8.5 gm/dL  Low  12.8-17.0  

 

 Hematocrit  27.2 %  Low  38.0-48.0  

 

 Mean Cell Volume  96.1 fl  High  80.0-96.0  

 

 Mean Corpuscular HGB  30.0 pg    27.0-33.0  

 

 Mean Corpuscular HGB Conc  31.3 g/dL  Low  31.7-36.0  

 

 Platelet Count  488 K/uL  High  150-400  

 

 Red Cell Distri Width %CV  13.9 %    11.6-15.8  

 

 Mean Platelet Volume  9.9 fL    6.6-10.6  









 Basic Metabolic Panel  2017  [facility]  Glucose  92 mg/dL    74-
106  



     [phone]          









 BUN  27 mg/dL  High  7-18  

 

 Creatinine  1.1 mg/dL    0.6-1.3  

 

 Glom Filtration Rate, Estimate  >60 mL/min    >60  

 

 If African American  >60 mL/min    >60  66

 

 BUN/Creat  24.5 ratio      

 

 Sodium  141 mmol/L    136-145  

 

 Potassium  4.5 mmol/L    3.5-5.1  

 

 Chloride  108 mmol/L  High    

 

 Carbon Dioxide  23 mmol/L    21-32  

 

 Anion Gap  10 mEq/L    8-16  

 

 Calcium  9.0 mg/dL    8.5-10.1  









 Laboratory  2017  [facility]  Glucose,Bedside  88 mg/dL      
67



 test finding    [phone]          

 

 Laboratory  2017  [facility]  Glucose,Bedside  146 mg/dL  High  70-
110  68



 test finding    [phone]          

 

 Laboratory  2017  [facility]  Glucose,Bedside  113 mg/dL  High  70-
110  69



 test finding    [phone]          

 

 Laboratory  2017  [facility]  Glucose,Bedside  123 mg/dL  High  70-
110  70



 test finding    [phone]          

 

 Laboratory  2017  [facility]  Glucose,Bedside  94 mg/dL      
71



 test finding    [phone]          

 

 Laboratory  2017  [facility]  Glucose,Bedside  128 mg/dL  High  70-
110  



 test finding    [phone]          

 

 Laboratory  2017  [facility]  Glucose,Bedside  84 mg/dL      



 test finding    [phone]          

 

 Laboratory  2017  [facility]  Glucose,Bedside  151 mg/dL  High  70-
110  72



 test finding    [phone]          

 

 Laboratory  2017  [facility]  Glucose,Bedside  128 mg/dL  High  70-
110  73



 test finding    [phone]          

 

 Laboratory  2017  [facility]  Glucose,Bedside  172 mg/dL  High  70-
110  74



 test finding    [phone]          

 

 Microalbumin,R  2016  [facility]  Microalbumin,Urine  31.0 mg/L    
< 20.0  75



 andom Urine    [phone]          









 @[facility] Pat Id:  [id number]      

 

 @EMR Req #:  [id number]      









 Hemoglobin A1c  2016  [facility]  Glycohemoglobin  6.7 %  High  4.2
-6.3  76



     [phone]  (A1c)        









 eAG  146 mg/dL      

 

 @EMR Pat Id:  [id number]      

 

 @EMR Req #:  [id number]      









 Basic Metabolic Panel  2016  [facility]  Glucose  138 mg/dL  High  
  



     [phone]          









 BUN  28 mg/dL  High  7-18  

 

 Creatinine  1.2 mg/dL    0.6-1.3  

 

 Glom Filtration Rate, Estimate  >60 mL/min    >60  

 

 If African American  >60 mL/min    >60  77

 

 BUN/Creat  23.3 ratio      

 

 Sodium  140 mmol/L    136-145  

 

 Potassium  4.4 mmol/L    3.5-5.1  

 

 Chloride  106 mmol/L      

 

 Carbon Dioxide  27 mmol/L    21-32  

 

 Anion Gap  7 mEq/L  Low  8-16  

 

 Calcium  9.0 mg/dL    8.5-10.1  

 

 @[facility] Pat Id:  [id number]      

 

 @[facility] Req #:  [id number]      

 

 Is Patient Fasting?  Fasting      









 Basic Metabolic Panel  2016  [facility]  Glucose  135 mg/dL  High  
  



     [phone]          









 BUN  27 mg/dL  High  7-18  

 

 Creatinine  1.2 mg/dL    0.6-1.3  

 

 Glom Filtration Rate, Estimate  >60 mL/min    >60  

 

 If African American  >60 mL/min    >60  78

 

 BUN/Creat  22.5 ratio      

 

 Sodium  141 mmol/L    136-145  

 

 Potassium  4.3 mmol/L    3.5-5.1  

 

 Chloride  106 mmol/L      

 

 Carbon Dioxide  27 mmol/L    21-32  

 

 Anion Gap  8 mEq/L    8-16  

 

 Calcium  9.1 mg/dL    8.5-10.1  









 Glycohemoglobin A1c  2016  [facility]  Glycohemoglobin  6.3 %    
4.2-6.3  79



     [phone]  (A1c)        









 eAG  134 mg/dL      









 [facility]  2016  [facility] [facility]  White Blood Count  6.6 K/uL    3.4-10.5  



     [phone]          









 Red Blood Count  4.26 M/uL    4.20-5.80  

 

 Hemoglobin  13.6 gm/dL    12.8-17.0  

 

 Hematocrit  40.6 %    38.0-48.0  

 

 Mean Cell Volume  95.3 fl    80.0-96.0  

 

 Mean Corpuscular HGB  31.9 pg    27.0-33.0  

 

 Mean Corpuscular HGB Conc  33.5 g/dL    31.7-36.0  

 

 Platelet Count  169 K/uL    150-400  

 

 Red Cell Distri Width %CV  12.6 %    11.6-15.8  

 

 Mean Platelet Volume  10.8 fL  High  6.6-10.6  









 Laboratory  2016  [facility]  Microalbumin,Random  12.0    < 20.0  



 test finding    [phone]  Urine  mg/L      

 

 Basic  02/15/2016  [facility]  Glucose  127  High    



 Metabolic    [phone]    mg/dL      



 Panel              









 BUN  20 mg/dL  High  7-18  

 

 Creatinine  1.1 mg/dL    0.6-1.3  

 

 Glom Filtration Rate, Estimate  >60 mL/min    >60  

 

 If African American  >60 mL/min    >60  80

 

 BUN/Creat  18.1 ratio      

 

 Sodium  142 mmol/L    136-145  

 

 Potassium  4.2 mmol/L    3.5-5.1  

 

 Chloride  106 mmol/L      

 

 Carbon Dioxide  26 mmol/L    21-32  

 

 Anion Gap  10 mEq/L    8-16  

 

 Calcium  9.1 mg/dL    8.5-10.1  









 Hemoglobin A1c  02/15/2016  [facility]  Glycohemoglobin (A1c)  6.3 %    
4.2-6.3  81



     [phone]          









 eAG  134 mg/dL      









 Laboratory  02/15/2016  [facility]  Microalbumin,Random  11.8    < 20.0  



 test finding    [phone]  Urine  mg/L      

 

 Laboratory  2015  [facility]  Prostate Specific  2.33      82



 test finding    [phone]  Antigen  ng/mL      

 

 Laboratory  2015  [facility]  Microalbumin,Random  8.8 mg/L    < 
20.0  



 test finding    [phone]  Urine        

 

 CBC  2015  [facility]  White Blood Count  5.4 K/uL    3.4-10.  



     [phone]        5  









 Red Blood Count  4.26 M/uL    4.20-5.80  

 

 Hemoglobin  13.8 gm/dL    12.8-17.0  

 

 Hematocrit  40.0 %    38.0-48.0  

 

 Mean Cell Volume  93.9 fl    80.0-96.0  

 

 Mean Corpuscular HGB  32.4 pg    27.0-33.0  

 

 Mean Corpuscular HGB Conc  34.5 g/dL    31.7-36.0  

 

 Platelet Count  177 K/uL    150-400  

 

 Red Cell Distri Width %CV  12.5 %    11.6-15.8  

 

 Mean Platelet Volume  10.2 fL    6.6-10.6  









 Comprehensive Metabolic  2015  [facility]  Glucose  143 mg/dL  
High    



 Panel    [phone]          









 BUN  23 mg/dL  High  7-18  

 

 Creatinine  1.1 mg/dL    0.6-1.3  

 

 Glom Filtration Rate, Estimate  >60 mL/min    >60  

 

 If African American  >60 mL/min    >60  83

 

 BUN/Creat  20.9 ratio      

 

 Sodium  138 mmol/L    136-145  

 

 Potassium  4.2 mmol/L    3.5-5.1  

 

 Chloride  106 mmol/L      

 

 Carbon Dioxide  26 mmol/L    21-32  

 

 Anion Gap  6 mEq/L  Low  8-16  

 

 Calcium  8.8 mg/dL    8.5-10.1  

 

 Total Protein  7.1 g/dL    6.4-8.2  

 

 Albumin  3.7 g/dL    3.4-5.0  

 

 Globulin  3.4 g/dL    1.9-4.3  

 

 Alb/Glob  1.1 ratio      

 

 Bilirubin,Total  0.6 mg/dL    0.2-1.0  

 

 Sgot/Ast  12 U/L  Low  15-37  84

 

 SGPT/Alt  29 U/L    12-78  

 

 Alkaline Phosphatase  66 U/L      









 LDL Cholesterol Profile  2015  [facility]  Cholesterol  184 mg/dL 
   <200  85



     [phone]          









 Triglycerides  203 mg/dL  High  <150  86

 

 HDL Cholesterol  43 mg/dL    >40  87

 

 LDL-Cholesterol  100 mg/dL    < 100  88









 Glycohemoglobin A1c  2015  [facility]  Glycohemoglobin  5.7 %    
4.2-6.3  89



     [phone]  (A1c)        









 eAG  117 mg/dL      









 Glycohemoglobin  2015  [facility]  Glycohemoglobin  6.8 %  High  
4.2-6.3  90



 A1c    [phone]  (A1c)        









 eAG  148 mg/dL      









 Glycohemoglobin  2015  [facility]  Glycohemoglobin  6.9 %  High  
4.2-6.3  91



 A1c    [phone]  (A1c)        









 eAG  151 mg/dL      









 Glycohemoglobin  2014  [facility]  Glycohemoglobin  7.2 %  High  
4.2-6.3  92



 A1c    [phone]  (A1c)        









 eAG  160 mg/dL      









 Comprehensive Metabolic  2014  [facility]  Glucose  150 mg/dL  
High    



 Panel    [phone]          









 BUN  24 mg/dL  High  7-18  

 

 Creatinine  1.0 mg/dL    0.6-1.3  

 

 Glom Filtration Rate, Estimate  >60 mL/min    >60  

 

 If African American  >60 mL/min    >60  93

 

 BUN/Creat  24.0 ratio      

 

 Sodium  138 mmol/L    136-145  

 

 Potassium  4.4 mmol/L    3.5-5.1  

 

 Chloride  105 mmol/L      

 

 Carbon Dioxide  27 mmol/L    21-32  

 

 Anion Gap  10 mEq/L    8-16  

 

 Calcium  9.2 mg/dL    8.5-10.1  

 

 Total Protein  7.1 g/dL    6.4-8.2  

 

 Albumin  3.7 g/dL    3.4-5.0  

 

 Globulin  3.4 g/dL    1.9-4.3  

 

 Alb/Glob  1.1 ratio      

 

 Bilirubin,Total  0.7 mg/dL    0.2-1.0  

 

 Sgot/Ast  12 U/L  Low  15-37  94

 

 SGPT/Alt  24 U/L    12-78  

 

 Alkaline Phosphatase  61 U/L      









 LDL Cholesterol Profile  2014  [facility]  Cholesterol  167 mg/dL 
   < 200  95



     [phone]          









 Triglycerides  144 mg/dL    < 150  96

 

 HDL Cholesterol  56 mg/dL    > 40  97

 

 LDL-Cholesterol  82 mg/dL    < 100  98









 Laboratory test  2014  [facility]  Prostate Specific  1.57      99



 finding    [phone]  Antigen  ng/mL      

 

 Glycohemoglobin A1c  2014  [facility]  Glycohemoglobin  7.3 %  
High  4.2  100



     [phone]  (A1c)      -6.  



             3  









 eAG  163 mg/dL      









 Laboratory test  2014  [facility]  Microalbumin,Random  6.9    <  



 finding    [phone]  Urine  mg/L    20.0  

 

 Laboratory test  2014  [facility]  Microalbumin,Random  8.9    0.0-
1  



 finding    [phone]  Urine  mg/L    8.5  

 

 Glycohemoglobin  2014  [facility]  Glycohemoglobin (A1c)  6.4 %  
High  4.8-6  101



 A1c    [phone]        .0  









 eAG  137 mg/dL      









 Laboratory test  2013  [facility]  Microalbumin,Random  6.8    0.0-
18.5  



 finding    [phone]  Urine  mg/L      

 

 Comprehensive  2013  [facility]  Glucose  108      



 Metabolic Panel    [phone]    mg/dL      









 BUN  21 mg/dL    5-23  

 

 Creatinine  0.8 mg/dL    0.5-1.4  

 

 Glom Filtration Rate, Estimate  >60 mL/min    >60  

 

 If African American  >60 mL/min    >60  102

 

 BUN/Creat  26.2 ratio      

 

 Sodium  140 mmol/L    136-145  

 

 Potassium  4.1 mmol/L    3.5-5.1  

 

 Chloride  107 mmol/L      

 

 Carbon Dioxide  26 mEq/L    18-29  

 

 Anion Gap  11 mEq/L    8-16  

 

 Calcium  9.4 mg/dL    8.5-10.1  

 

 Total Protein  7.3 g/dL    6.3-8.0  

 

 Albumin  3.8 g/dL    3.5-5.0  

 

 Globulin  3.5 g/dL    1.9-4.3  

 

 Alb/Glob  1.1 ratio      

 

 Bilirubin,Total  0.9 mg/dL    0.2-1.2  

 

 Sgot/Ast  10 U/L  Low  16-40  

 

 SGPT/Alt  22 U/L  Low  30-65  

 

 Alkaline Phosphatase  69 U/L      









 LDL Cholesterol Profile  2013  [facility]  Cholesterol  170 mg/dL 
   120-200  



     [phone]          









 Triglycerides  128 mg/dL      

 

 HDL Cholesterol  45 mg/dL    29-83  

 

 LDL-Cholesterol  99 mg/dL      









 Glycohemoglobin A1c  2013  [facility]  Glycohemoglobin  5.7 %    
4.8-6.0  103



     [phone]  (A1c)        









 eAG  117 mg/dL      









 Comprehensive Metabolic  2013  [facility]  Glucose  122 mg/dL  
High    



 Panel    [phone]          









 BUN  23 mg/dL    5-23  

 

 Creatinine  0.9 mg/dL    0.5-1.4  

 

 Glom Filtration Rate, Estimate  >60 mL/min    >60  

 

 If African American  >60 mL/min    >60  104

 

 BUN/Creat  25.5 ratio      

 

 Sodium  139 mmol/L    136-145  

 

 Potassium  4.3 mmol/L    3.5-5.1  

 

 Chloride  104 mmol/L      

 

 Carbon Dioxide  28 mEq/L    18-29  

 

 Anion Gap  11 mEq/L    8-16  

 

 Calcium  9.5 mg/dL    8.5-10.1  

 

 Total Protein  7.5 g/dL    6.3-8.0  

 

 Albumin  4.0 g/dL    3.5-5.0  

 

 Globulin  3.5 g/dL    1.9-4.3  

 

 Alb/Glob  1.1 ratio      

 

 Bilirubin,Total  0.7 mg/dL    0.2-1.2  

 

 Sgot/Ast  7 U/L  Low  16-40  

 

 SGPT/Alt  25 U/L  Low  30-65  

 

 Alkaline Phosphatase  69 U/L      









 Glycohemoglobin A1c  2013  [facility]  Glycohemoglobin  5.8 %    
4.8-6.0  105



     [phone]  (A1c)        









 eAG  120 mg/dL      









 LDL Cholesterol  2013  [facility]  Cholesterol  205 mg/dL  High  
120-200  



 Profile    [phone]          









 Triglycerides  116 mg/dL      

 

 HDL Cholesterol  52 mg/dL    29-83  

 

 LDL-Cholesterol  130 mg/dL      









 Laboratory test  2013  [facility]  Microalbumin,Random  8.9    0.0-
18.5  



 finding    [phone]  Urine  mg/L      

 

 Glycohemoglobin  2012  [facility]  Glycohemoglobin  6.2 %  High  
4.8-6.0  106



 A1c    [phone]  (A1c)        









 eAG  131 mg/dL      









 LDL Cholesterol Profile  2012  [facility]  Cholesterol  188 mg/dL 
   120-200  



     [phone]          









 Triglycerides  76 mg/dL      

 

 HDL Cholesterol  59 mg/dL    29-83  

 

 LDL-Cholesterol  114 mg/dL      









 Glycohemoglobin  2012  [facility]  Glycohemoglobin  6.5 %  High  
4.8-6.0  107



 A1c    [phone]  (A1c)        









 eAG  140 mg/dL      









 Laboratory test  2012  [facility]  Prostate  1.38    0.0-4.0  108



 finding    [phone]  Specific  ng/mL      



       Antigen        

 

 Comprehensive  2012  [facility]  Glucose  135 mg/dL  High    



 Metabolic Panel    [phone]          









 BUN  20 mg/dL    5-23  

 

 Creatinine  0.7 mg/dL    0.5-1.4  

 

 Glom Filtration Rate, Estimate  >60 mL/min    >60  

 

 If African American  >60 mL/min    >60  109

 

 BUN/Creat  28.5 ratio      

 

 Sodium  141 mmol/L    136-145  

 

 Potassium  4.8 mmol/L    3.5-5.1  

 

 Chloride  106 mmol/L      

 

 Carbon Dioxide  27 mEq/L    18-29  

 

 Anion Gap  13 mEq/L    8-16  

 

 Calcium  9.0 mg/dL    8.5-10.1  

 

 Total Protein  7.2 g/dL    6.3-8.0  

 

 Albumin  3.7 g/dL    3.5-5.0  

 

 Globulin  3.5 g/dL    1.9-4.3  

 

 Alb/Glob  1.1 ratio      

 

 Bilirubin,Total  1.0 mg/dL    0.2-1.2  

 

 Sgot/Ast  11 U/L  Low  16-40  

 

 SGPT/Alt  24 U/L  Low  30-65  

 

 Alkaline Phosphatase  60 U/L      









 Laboratory test  2012  [facility]  Thyroid Stim  1.14 uIU/mL    
0.49-4.67  



 finding    [phone]  Hormone        

 

 CBS W/Automated  2012  [facility]  White Blood  5.6 K/uL    3.4-
10.5  



 Diff    [phone]  Count        









 Red Blood Count  4.58 M/uL    4.20-5.80  

 

 Hemoglobin  14.3 gm/dL    12.8-17.0  

 

 Hematocrit  41.8 %    38.0-48.0  

 

 Mean Cell Volume  91.3 fl    80.0-96.0  

 

 Mean Corpuscular HGB  31.2 pg    27.0-33.0  

 

 Mean Corpuscular HGB Conc  34.2 g/dL    31.7-36.0  

 

 Platelet Count  170 K/uL    150-400  

 

 Red Cell Distri Width SD  41.4 fl    36-51  

 

 Red Cell Distri Width %CV  12.6 %    11.6-15.8  

 

 Mean Platelet Volume  10.9 fL  High  6.6-10.6  

 

 Neut%  43.2 %    33.0-73.0  

 

 Lymph %  37.9 %    17.0-56.0  

 

 Mono %  8.3 %    0.0-10.0  

 

 Eo%  9.7 %  High  0.0-5.0  

 

 Bas%  0.9 %    0.1-1.0  

 

 Neut#  2.40 K/uL    1.8-7.0  

 

 Lymph #  2.11 K/uL    1.2-4.0  

 

 Mono #  0.46 K/uL    0.0-0.6  

 

 Eos #  0.54 K/uL  High  0.0-0.5  

 

 Baso #  0.05 K/uL  Low  0.1-0.2  









 1  E11.9 E79.0 F33.1 Z22.322

 

 2  Note:



   Persistent reduction for 3 months or more in an eGFR <60



   mL/min/1.73 m2 defines CKD.  Patients with eGFR values >/=60



   mL/min/1.73 m2 may also have CKD if evidence of persistent



   proteinuria is present.



   



   The original MDRD equation for estimated GFR is not valid



   for patients less than 18 years of age.



   



   Additional information may be found at www.kdoqi.org.

 

 3  Values below the stated reference ranges of AST and ALT can



   be seen in normal populations. Clinical correlation is



   suggested.

 

 4  Elevated levels of HbA1c suggest the need for more



   aggressive treatment of glycemia.



   



   The American Diabetes Association recommends that a primary



   goal of therapy should be a HbA1c of <7% and that physicians



   should re-evaluate the treatment regimen in patients with



   HbA1c values consistently >8%.

 

 5  Method: Sediplast Modified Westergren

 

 6  E11.9 I10 E79.0

 

 7  Elevated levels of HbA1c suggest the need for more



   aggressive treatment of glycemia.



   



   The American Diabetes Association recommends that a primary



   goal of therapy should be a HbA1c of <7% and that physicians



   should re-evaluate the treatment regimen in patients with



   HbA1c values consistently >8%.

 

 8  Note:



   Persistent reduction for 3 months or more in an eGFR <60



   mL/min/1.73 m2 defines CKD.  Patients with eGFR values >/=60



   mL/min/1.73 m2 may also have CKD if evidence of persistent



   proteinuria is present.



   



   The original MDRD equation for estimated GFR is not valid



   for patients less than 18 years of age.



   



   Additional information may be found at www.kdoqi.org.

 

 9  Values below the stated reference ranges of AST and ALT can



   be seen in normal populations. Clinical correlation is



   suggested.

 

 10  T84.7XXA

 

 11  Method: Sediplast Modified Westergren

 

 12  Note:



   Persistent reduction for 3 months or more in an eGFR <60



   mL/min/1.73 m2 defines CKD.  Patients with eGFR values >/=60



   mL/min/1.73 m2 may also have CKD if evidence of persistent



   proteinuria is present.



   



   The original MDRD equation for estimated GFR is not valid



   for patients less than 18 years of age.



   



   Additional information may be found at www.kdoqi.org.

 

 13  Values below the stated reference ranges of AST and ALT can



   be seen in normal populations. Clinical correlation is



   suggested.

 

 14  M10.9 E11.9 N17.9 F32.9 T84.51XD T84.7XXD Z79.2

 

 15  FAX TO INFECTIOUS DISEASE NP JOSE CISSE [phone]

 

 16  Elevated levels of HbA1c suggest the need for more



   aggressive treatment of glycemia.



   



   The American Diabetes Association recommends that a primary



   goal of therapy should be a HbA1c of <7% and that physicians



   should re-evaluate the treatment regimen in patients with



   HbA1c values consistently >8%.

 

 17  Note:



   Persistent reduction for 3 months or more in an eGFR <60



   mL/min/1.73 m2 defines CKD.  Patients with eGFR values >/=60



   mL/min/1.73 m2 may also have CKD if evidence of persistent



   proteinuria is present.



   



   The original MDRD equation for estimated GFR is not valid



   for patients less than 18 years of age.



   



   Additional information may be found at www.kdoqi.org.

 

 18  Values below the stated reference ranges of AST and ALT can



   be seen in normal populations. Clinical correlation is



   suggested.

 

 19  Vitamin D deficiency has been defined by the [place] of



   Medicine and an Endocrine Society practice guideline as a



   level of serum 25-OH vitamin D less than 20 ng/mL (1,2).



   The Endocrine Society went on to further define vitamin D



   insufficiency as a level between 21 and 29 ng/mL (2).



   1. IOM ([place] of Medicine). 2010. Dietary reference



   intakes for calcium and D. [place]: The



   National Academies Press.



   2. Eileen GAMING, [place], Kitty HERNANDEZ, et al.



   Evaluation, treatment, and prevention of vitamin D



   deficiency: an Endocrine Society clinical practice



   guideline. JCEM. 2011; 96(7):1911-30.



   Performed at:  RN - [facility] [address]  [id number]



   : Araceli B Reyes MD, Phone:  [phone]

 

 20  FAX TO INFECTIOUS DISEASE DANIAL CISSE [phone]

 

 21  Method: Sediplast Modified Westergren

 

 22  T84.7XXA

 

 23  Note:



   Persistent reduction for 3 months or more in an eGFR <60



   mL/min/1.73 m2 defines CKD.  Patients with eGFR values >/=60



   mL/min/1.73 m2 may also have CKD if evidence of persistent



   proteinuria is present.



   



   The original MDRD equation for estimated GFR is not valid



   for patients less than 18 years of age.



   



   Additional information may be found at www.kdoqi.org.

 

 24  Values below the stated reference ranges of AST and ALT can



   be seen in normal populations. Clinical correlation is



   suggested.

 

 25  PLEASE FAX ALL LABS TO JOSE PENG -[phone]

 

 26  Method: Sediplast Modified Westergren

 

 27  Note:



   Persistent reduction for 3 months or more in an eGFR <60



   mL/min/1.73 m2 defines CKD.  Patients with eGFR values >/=60



   mL/min/1.73 m2 may also have CKD if evidence of persistent



   proteinuria is present.



   



   The original MDRD equation for estimated GFR is not valid



   for patients less than 18 years of age.



   



   Additional information may be found at www.kdoqi.org.

 

 28  Values below the stated reference ranges of AST and ALT can



   be seen in normal populations. Clinical correlation is



   suggested.

 

 29  Note:



   Persistent reduction for 3 months or more in an eGFR <60



   mL/min/1.73 m2 defines CKD.  Patients with eGFR values >/=60



   mL/min/1.73 m2 may also have CKD if evidence of persistent



   proteinuria is present.



   



   The original MDRD equation for estimated GFR is not valid



   for patients less than 18 years of age.



   



   Additional information may be found at www.kdoqi.org.

 

 30  Values below the stated reference ranges of AST and ALT can



   be seen in normal populations. Clinical correlation is



   suggested.

 

 31  M10.9, E11.9, I10,

 

 32  Note:



   Persistent reduction for 3 months or more in an eGFR <60



   mL/min/1.73 m2 defines CKD.  Patients with eGFR values >/=60



   mL/min/1.73 m2 may also have CKD if evidence of persistent



   proteinuria is present.



   



   The original MDRD equation for estimated GFR is not valid



   for patients less than 18 years of age.



   



   Additional information may be found at www.kdoqi.org.

 

 33  Values below the stated reference ranges of AST and ALT can



   be seen in normal populations. Clinical correlation is



   suggested.

 

 34  Elevated levels of HbA1c suggest the need for more



   aggressive treatment of glycemia.



   



   The American Diabetes Association recommends that a primary



   goal of therapy should be a HbA1c of <7% and that physicians



   should re-evaluate the treatment regimen in patients with



   HbA1c values consistently >8%.

 

 35  Method: Sediplast Modified Westergren

 

 36  T84.7XXA

 

 37  Note:



   Persistent reduction for 3 months or more in an eGFR <60



   mL/min/1.73 m2 defines CKD.  Patients with eGFR values >/=60



   mL/min/1.73 m2 may also have CKD if evidence of persistent



   proteinuria is present.



   



   The original MDRD equation for estimated GFR is not valid



   for patients less than 18 years of age.



   



   Additional information may be found at www.kdoqi.org.

 

 38  Values below the stated reference ranges of AST and ALT can



   be seen in normal populations. Clinical correlation is



   suggested.

 

 39  FAX TO DANIAL CISSE -[phone]

 

 40  Method: Sediplast Modified Westergren

 

 41  M10.9

 

 42  Method: Sediplast Modified Westergren

 

 43  Method: Sediplast Modified Westergren

 

 44  L03.116

 

 45  R13.11 R62.7

 

 46  Note:



   Persistent reduction for 3 months or more in an eGFR <60



   mL/min/1.73 m2 defines CKD.  Patients with eGFR values >/=60



   mL/min/1.73 m2 may also have CKD if evidence of persistent



   proteinuria is present.



   



   The original MDRD equation for estimated GFR is not valid



   for patients less than 18 years of age.



   



   Additional information may be found at www.kdoqi.org.

 

 47  *******Because ethnic data is not always readily available,



   this report includes an eGFR for both -Americans and



   non- Americans.****



   The National Kidney Disease Education Program (NKDEP) does



   not endorse the use of the MDRD equation for patients that



   are not between the ages of 18 and 70, are pregnant, have



   extremes of body size, muscle mass, or nutritional status,



   or are non- or non-.



   According to the National Kidney Foundation, irrespective of



   diagnosis, the stage of the disease is based on the level of



   kidney function:



   Stage Description                      GFR(mL/min/1.73 m(2))



   1     Kidney damage with normal or decreased GFR       90



   2     Kidney damage with mild decrease in GFR          60-89



   3     Moderate decrease in GFR                         30-59



   4     Severe decrease in GFR                           15-29



   5     Kidney failure                       <15 (or dialysis)

 

 48  Therapeutic target for the treatment of diabetes



   [place] patients is <7% HBA1C, and in selective



   patients <6.0%.Please refer to American Diabetes



   Association Diabetic care guidelines for further



   information.

 

 49  Normal Range 180 to 914



   Indeterminate Range 145 to 180



   Deficient Range  <145

 

 50  HYPOGLYCEMIA WITH NAUASEA AND VOMITING

 

 51  GRAM POSITIVE COCCI SEEN ON GRAM STAIN.



   STAPHYLOCOCCUS AUREUS

 

 52  Test not performed

 

 53  URINE, CLEAN CATCH

 

 54  Note:



   Persistent reduction for 3 months or more in an eGFR <60



   mL/min/1.73 m2 defines CKD.  Patients with eGFR values >/=60



   mL/min/1.73 m2 may also have CKD if evidence of persistent



   proteinuria is present.



   



   The original MDRD equation for estimated GFR is not valid



   for patients less than 18 years of age.



   



   Additional information may be found at www.kdoqi.org.

 

 55  0.0 - 0.045 ng/mL: Normal



   0.046 - 0.5 ng/mL: Suggestive



   0.6 - 1.5 ng/mL: Consistent

 

 56  **CALLED LAC TO JAVI BAKER AT 1542 17 by LAB.JOSE ANTONIO**

 

 57  17 1536:



   NEUT% previously reported as: 89.8 H %



   Amended result called to: [] - 17 at 1536



   



   17 1536:



   LYMPH % previously reported as: 5.5 L %



   Amended result called to: [] - 17 at 1536



   



   17 1536:



   MONO % previously reported as: 3.7  %



   Amended result called to: [] - 17 at 1536



   



   17 1536:



   EO% previously reported as: 0.8  %



   Amended result called to: [] - 17 at 1536



   



   17 1536:



   BAS% previously reported as: 0.2  %



   Amended result called to: [] - 17 at 1536

 

 58  PATH REVIEW INDICATED FROM DIFF



   



   **CALLED JAVI GAY WITH 39% BANDS,



   AT 1543 17 by LAB.MPK**

 

 59  INDICATED,SLIDE SENT



   Hematology Consultation Final Report



   Case# [id number]



   



   Peripheral Blood smear:



   Review of peripheral blood smear confirms the hemogram



   findings. There is normocytic normochromic anemia, moderate.



   Frequent bilobed neutrophils are present. Neutrophils show



   toxic granulation, cytoplasmic vacuolization and Dolhe



   bodies. Bandemia is also present. Acute bacterial infection



   should be ruled out. Bone marrow examination should be



   considered to rule out myelodysplastic syndrome.



   



   Paradise Sadler MD



   Reported 2017 at 8:00pm, Report electronically signed



   



   



   Performed at: [facility]



   [id number] [address]

 

 60  GRAM POSITIVE COCCI SEEN ON GRAM STAIN.



   STAPHYLOCOCCUS AUREUS

 

 61  GRAM POSITIVE COCCI SEEN ON GRAM STAIN.

 

 62  STAPHYLOCOCCUS AUREUS

 

 63   #[id number]

 

 64  Note:



   Persistent reduction for 3 months or more in an eGFR <60



   mL/min/1.73 m2 defines CKD.  Patients with eGFR values >/=60



   mL/min/1.73 m2 may also have CKD if evidence of persistent



   proteinuria is present.



   



   The original MDRD equation for estimated GFR is not valid



   for patients less than 18 years of age.



   



   Additional information may be found at www.kdoqi.org.

 

 65  Charting This Result

 

 66  Note:



   Persistent reduction for 3 months or more in an eGFR <60



   mL/min/1.73 m2 defines CKD.  Patients with eGFR values >/=60



   mL/min/1.73 m2 may also have CKD if evidence of persistent



   proteinuria is present.



   



   The original MDRD equation for estimated GFR is not valid



   for patients less than 18 years of age.



   



   Additional information may be found at www.kdoqi.org.

 

 67  Charting This Result

 

 68  Charting This Result

 

 69  Charting This Result

 

 70  Charting This Result

 

 71  Charting This Result

 

 72  Charting This Result

 

 73  Charting This Result

 

 74  Charting This Result

 

 75  E11.9

 

 76  Elevated levels of HbA1c suggest the need for more



   aggressive treatment of glycemia.



   



   The American Diabetes Association recommends that a primary



   goal of therapy should be a HbA1c of <7% and that physicians



   should re-evaluate the treatment regimen in patients with



   HbA1c values consistently >8%.

 

 77  Note:



   Persistent reduction for 3 months or more in an eGFR <60



   mL/min/1.73 m2 defines CKD.  Patients with eGFR values >/=60



   mL/min/1.73 m2 may also have CKD if evidence of persistent



   proteinuria is present.



   



   The original MDRD equation for estimated GFR is not valid



   for patients less than 18 years of age.



   



   Additional information may be found at www.kdoqi.org.

 

 78  Note:



   Persistent reduction for 3 months or more in an eGFR <60



   mL/min/1.73 m2 defines CKD.  Patients with eGFR values >/=60



   mL/min/1.73 m2 may also have CKD if evidence of persistent



   proteinuria is present.



   The original MDRD equation for estimated GFR is not valid



   for patients less than 18 years of age.



   Additional information may be found at www.kdoqi.org.

 

 79  Elevated levels of HbA1c suggest the need for more



   aggressive treatment of glycemia.



   The American Diabetes Association recommends that a primary



   goal of therapy should be a HbA1c of <7% and that physicians



   should re-evaluate the treatment regimen in patients with



   HbA1c values consistently >8%.

 

 80  Note:



   Persistent reduction for 3 months or more in an eGFR <60



   mL/min/1.73 m2 defines CKD.  Patients with eGFR values >/=60



   mL/min/1.73 m2 may also have CKD if evidence of persistent



   proteinuria is present.



   The original MDRD equation for estimated GFR is not valid



   for patients less than 18 years of age.



   Additional information may be found at www.kdoqi.org.

 

 81  Elevated levels of HbA1c suggest the need for more



   aggressive treatment of glycemia.



   The American Diabetes Association recommends that a primary



   goal of therapy should be a HbA1c of <7% and that physicians



   should re-evaluate the treatment regimen in patients with



   HbA1c values consistently >8%.

 

 82  THIS ASSAY IS NOT INTENDED AS A CANCER SCREENING TEST



   The concentration of PSA in a given specimen, determined



   with assays from different manufacturers, can vary due to



   differences in assay methods and reagent specificity. Values



   obtained from different assay methods cannot be used



   interchangeably.

 

 83  Note:



   Persistent reduction for 3 months or more in an eGFR <60



   mL/min/1.73 m2 defines CKD.  Patients with eGFR values >/=60



   mL/min/1.73 m2 may also have CKD if evidence of persistent



   proteinuria is present.



   The original MDRD equation for estimated GFR is not valid



   for patients less than 18 years of age.



   Additional information may be found at www.kdoqi.org.

 

 84  Values below the stated reference ranges of AST and ALT can



   be seen in normal populations. Clinical correlation is



   suggested.

 

 85  Reference Guidelines*:



   Desirable: ........... < 200 mg/dL



   Borderline High: ..... 200-239 mg/dL



   High: ................ >=240 mg/dL



   * The National Cholesterol Education Program (NCEP)

 

 86  Reference Guidelines*:



   Normal: ............. < 150 mg/dL



   Borderline High: .... 150-199 mg/dL



   High: ............... 200-499 mg/dL



   Very High: .......... > 500 mg/dL



   * Source: National Cholesterol Education Program (NCEP)

 

 87  Reference Guidelines*:



   Low HDL: ..... < 40 mg/dL



   Normal:  ..... 40-60 mg/dL



   Desirable: ... > 60 mg/dL



   *The National Cholesterol Education Program(NCEP)

 

 88  Reference Guidelines*:



   Optimal:........... <100 mg/dL



   Near Optimal....... 100-129 mg/dL



   Borderline High.... 130-159 mg/dL



   High............... 160-189 mg/dL



   Very High.......... >=190 mg/dL



   * Source: National Cholesterol Education Program (NCEP)

 

 89  Elevated levels of HbA1c suggest the need for more



   aggressive treatment of glycemia.



   The American Diabetes Association recommends that a primary



   goal of therapy should be a HbA1c of <7% and that physicians



   should re-evaluate the treatment regimen in patients with



   HbA1c values consistently >8%.

 

 90  Elevated levels of HbA1c suggest the need for more



   aggressive treatment of glycemia.



   The American Diabetes Association recommends that a primary



   goal of therapy should be a HbA1c of <7% and that physicians



   should re-evaluate the treatment regimen in patients with



   HbA1c values consistently >8%.

 

 91  Elevated levels of HbA1c suggest the need for more



   aggressive treatment of glycemia.



   The American Diabetes Association recommends that a primary



   goal of therapy should be a HbA1c of <7% and that physicians



   should re-evaluate the treatment regimen in patients with



   HbA1c values consistently >8%.

 

 92  Elevated levels of HbA1c suggest the need for more



   aggressive treatment of glycemia.



   The American Diabetes Association recommends that a primary



   goal of therapy should be a HbA1c of <7% and that physicians



   should re-evaluate the treatment regimen in patients with



   HbA1c values consistently >8%.

 

 93  Note:



   Persistent reduction for 3 months or more in an eGFR <60



   mL/min/1.73 m2 defines CKD.  Patients with eGFR values >/=60



   mL/min/1.73 m2 may also have CKD if evidence of persistent



   proteinuria is present.



   The original MDRD equation for estimated GFR is not valid



   for patients less than 18 years of age.



   Additional information may be found at www.kdoqi.org.

 

 94  Values below the stated reference ranges of AST and ALT can



   be seen in normal populations. Clinical correlation is



   suggested.

 

 95  Reference Guidelines*:



   Desirable: ........... < 200 mg/dL



   Borderline High: ..... 200-239 mg/dL



   High: ................ >=240 mg/dL



   * The National Cholesterol Education Program (NCEP)

 

 96  Reference Guidelines*:



   Normal: ............. < 150 mg/dL



   Borderline High: .... 150-199 mg/dL



   High: ............... 200-499 mg/dL



   Very High: .......... > 500 mg/dL



   * Source: National Cholesterol Education Program (NCEP)

 

 97  Reference Guidelines*:



   Low HDL: ..... < 40 mg/dL



   Normal:  ..... 40-60 mg/dL



   Desirable: ... > 60 mg/dL



   *The National Cholesterol Education Program(NCEP)

 

 98  Reference Guidelines*:



   Optimal:........... <100 mg/dL



   Near Optimal....... 100-129 mg/dL



   Borderline High.... 130-159 mg/dL



   High............... 160-189 mg/dL



   Very High.......... >=190 mg/dL



   * Source: National Cholesterol Education Program (NCEP)

 

 99  THIS ASSAY IS NOT INTENDED AS A CANCER SCREENING TEST



   The concentration of PSA in a given specimen, determined



   with assays from different manufacturers, can vary due to



   differences in assay methods and reagent specificity. Values



   obtained from different assay methods cannot be used



   interchangeably.

 

 100  Elevated levels of HbA1c suggest the need for more



   aggressive treatment of glycemia.



   The American Diabetes Association recommends that a primary



   goal of therapy should be a HbA1c of <7% and that physicians



   should re-evaluate the treatment regimen in patients with



   HbA1c values consistently >8%.

 

 101  A1c value between 5.7% and 6.4% is considered at increased



   risk for diabetes.



   A1c value greater than 6.5 % is considered essentially



   diagnostic for Type II diabetes.



   Current guidelines recommend a treatment goal of <7% for



   diabetic patients.



   This method will measure glycosylated hemoglobin variants,



   HbS, HbG, HbH, HbWayne, HbC, HbE, etc.  Other hemoglobin-



   opathies may give incorrect results with this test.

 

 102  Note:



   Persistent reduction for 3 months or more in an eGFR <60



   mL/min/1.73 m2 defines CKD.  Patients with eGFR values >/=60



   mL/min/1.73 m2 may also have CKD if evidence of persistent



   proteinuria is present.



   The original MDRD equation for estimated GFR is not valid



   for patients less than 18 years of age.



   Additional information may be found at www.kdoqi.org.

 

 103  A1c value between 5.7% and 6.4% is considered at increased



   risk for diabetes.



   A1c value greater than 6.5 % is considered essentially



   diagnostic for Type II diabetes.



   Current guidelines recommend a treatment goal of <7% for



   diabetic patients.



   This method will measure glycosylated hemoglobin variants,



   HbS, HbG, HbH, HbWayne, HbC, HbE, etc.  Other hemoglobin-



   opathies may give incorrect results with this test.

 

 104  Note:



   Persistent reduction for 3 months or more in an eGFR <60



   mL/min/1.73 m2 defines CKD.  Patients with eGFR values >/=60



   mL/min/1.73 m2 may also have CKD if evidence of persistent



   proteinuria is present.



   The original MDRD equation for estimated GFR is not valid



   for patients less than 18 years of age.



   Additional information may be found at www.kdoqi.org.

 

 105  A1c value between 5.7% and 6.4% is considered at increased



   risk for diabetes.



   A1c value greater than 6.5 % is considered essentially



   diagnostic for Type II diabetes.



   Current guidelines recommend a treatment goal of <7% for



   diabetic patients.



   This method will measure glycosylated hemoglobin variants,



   HbS, HbG, HbH, HbWayne, HbC, HbE, etc.  Other hemoglobin-



   opathies may give incorrect results with this test.

 

 106  A1c value between 5.7% and 6.4% is considered at increased



   risk for diabetes.



   A1c value greater than 6.5 % is considered essentially



   diagnostic for Type II diabetes.



   Current guidelines recommend a treatment goal of <7% for



   diabetic patients.



   This method will measure glycosylated hemoglobin variants,



   HbS, HbG, HbH, HbWayne, HbC, HbE, etc.  Other hemoglobin-



   opathies may give incorrect results with this test.

 

 107  A1c value between 5.7% and 6.4% is considered at increased



   risk for diabetes.



   A1c value greater than 6.5 % is considered essentially



   diagnostic for Type II diabetes.



   Current guidelines recommend a treatment goal of <7% for



   diabetic patients.



   This method will measure glycosylated hemoglobin variants,



   HbS, HbG, HbH, HbWayne, HbC, HbE, etc.  Other hemoglobin-



   opathies may give incorrect results with this test.

 

 108  THIS ASSAY IS NOT INTENDED AS A CANCER SCREENING TEST



   The concentration of PSA in a given specimen, determined



   with assays from different manufacturers, can vary due to



   differences in assay methods and reagent specificity. Values



   obtained from different assay methods cannot be used



   interchangeably.

 

 109  Note:



   Persistent reduction for 3 months or more in an eGFR <60



   mL/min/1.73 m2 defines CKD.  Patients with eGFR values >/=60



   mL/min/1.73 m2 may also have CKD if evidence of persistent



   proteinuria is present.



   The original MDRD equation for estimated GFR is not valid



   for patients less than 18 years of age.



   Additional information may be found at www.kdoqi.org.







Procedures







 Date  Code  Description  Status

 

 2015  06167  Audiogram, Screen Only Pure Tone  Completed

 

 2014  33104  Audiogram, Screen Only Pure Tone  Completed

 

 2013  66265  Color/Snellen Vision  Completed

 

 2013  04108  Hearing Test  Completed

 

 2012  26406  Color/Snellen Vision  Completed

 

 2012  01062  Hearing Test  Completed

 

 2011  37854  Color/Snellen Vision  Completed

 

 2011  18052  Hearing Test  Completed

 

 2010  61582  Color/Snellen Vision  Completed

 

 2010  03358  Hearing Test  Completed

 

 2007  [id number]  Colonoscopy  Completed







Encounters







 Type  Date  Location  Provider  Dx  Diagnosis

 

 Office Visit  2018  Main Office  Abisai Glover,  Z00.01  Encounter 
for



   10:00a    M.D.    general adult



           medical exam w



           abnormal findings









 N18.9  Chronic kidney disease, unspecified

 

 I10  Essential (primary) hypertension

 

 E79.0  Hyperuricemia w/o signs of inflam arthrit and tophaceous dis

 

 Z12.12  Encounter for screening for malignant neoplasm of rectum

 

 Z12.5  Encounter for screening for malignant neoplasm of prostate

 

 D64.9  Anemia, unspecified









 Office Visit  2018  8:00a  Main Office  Abisai SAHU  Type 2 diabetes



       JOSE LUIS Glover    mellitus without



           complications









 N18.9  Chronic kidney disease, unspecified

 

 I10  Essential (primary) hypertension

 

 E79.0  Hyperuricemia w/o signs of inflam arthrit and tophaceous dis

 

 F33.1  Major depressive disorder, recurrent, moderate

 

 B02.9  Zoster without complications

 

 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









 Office Visit  05/10/2018  2:15p  Main Office  Olivia Wilkinson PA  B02.9  Zoster 
without



           complications









 N18.9  Chronic kidney disease, unspecified









 Office Visit  2018  8:45a  Main Office  Abisai  [id number]  Type 2 holden Glover M.D.    mellitus without



           complications









 I10  Essential (primary) hypertension

 

 E79.0  Hyperuricemia w/o signs of inflam arthrit and tophaceous dis

 

 F33.1  Major depressive disorder, recurrent, moderate









 Office Visit  2018  2:30p  Main Office  Abisai  [id number]  Type 2 diabetes



       JOSE LUIS Glover    mellitus without



           complications









 I10  Essential (primary) hypertension

 

 N17.9  Acute kidney failure, unspecified

 

 M10.9  Gout, unspecified

 

 F32.9  Major depressive disorder, single episode, unspecified

 

 B35.4  Tinea corporis

 

 S43.421D  Sprain of right rotator cuff capsule, subsequent encounter

 

 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









 Office Visit  2017  9:30a  Main Office  Abisai  E11.9  Type 2 diabetes



       JOSE LUIS Glover    mellitus without



           complications









 I10  Essential (primary) hypertension

 

 N18.9  Chronic kidney disease, unspecified

 

 F32.9  Major depressive disorder, single episode, unspecified

 

 S72.001D  Fx unsp part of nk of r femr, subs for clos fx w routn heal









 Office Visit  2017  9:00a  Main Office  Abisai Glover,  Z00.01  
Encounter for



       M.D.    general adult



           medical exam w



           abnormal



           findings









 M10.9  Gout, unspecified

 

 L03.116  Cellulitis of left lower limb

 

 F32.9  Major depressive disorder, single episode, unspecified

 

 R63.0  Anorexia

 

 E11.9  Type 2 diabetes mellitus without complications

 

 I10  Essential (primary) hypertension

 

 Z12.5  Encounter for screening for malignant neoplasm of prostate

 

 Z12.12  Encounter for screening for malignant neoplasm of rectum









 Office Visit  2017  9:45a  Main Office  Abisai Glover  M10.9  Gout
, unspecified



       M.D.    









 L03.116  Cellulitis of left lower limb

 

 F32.9  Major depressive disorder, single episode, unspecified









 Office Visit  08/10/2017 11:30a  Main Office  Abisai Glover  M10.9  Gout
, unspecified



       M.D.    









 L03.116  Cellulitis of left lower limb

 

 F32.9  Major depressive disorder, single episode, unspecified









 Office Visit  2017  9:00a  Main Office  Abisai Glover  L03.116  
Cellulitis of



       M.D.    left lower limb









 M10.9  Gout, unspecified









 Office Visit  2017 10:45a  Main Office  Abisai Glover  L03.116  
Cellulitis of



       M.D.    left lower limb

 

 Office Visit  2017  8:15a  Main Office  Abisai Glover  R63.0  
Anorexia



       M.D.    









 M71.051  Abscess of bursa, right hip









 Office Visit  2017 11:30a  Main Office  Abisai Glover M.D.  R63.0  
Anorexia









 M75.111  Incomplete rotatr-cuff tear/ruptr of r shoulder, not trauma









 Office Visit  2017 10:45a  Main Office  Abisai Glover M.D.  R63.0  
Anorexia









 L92.9  Granulomatous disorder of the skin, subcu, unsp









 Office Visit  2017  4:15p  Main Office  Abisai Glover M.D.  R63.0  
Anorexia









 E11.9  Type 2 diabetes mellitus without complications

 

 D64.9  Anemia, unspecified









 Office Visit  05/10/2017  3:30p  Main Office  Abisai  R63.0  Dora Glover M.D.    

 

 Office Visit  02/15/2017 11:00a  Main Office  Abisai SAHU  Type 2 holden Glover M.D.    mellitus without



           complications









 I10  Essential (primary) hypertension

 

 M25.551  Pain in right hip

 

 K59.00  Constipation, unspecified

 

 L50.0  Allergic urticaria









 Office Visit  2016  8:00a  Main Office  Abisai SAHU  Type 2 holden Glover M.D.    mellitus without



           complications









 I10  Essential (primary) hypertension

 

 M19.91  Primary osteoarthritis, unspecified site

 

 J01.00  Acute maxillary sinusitis, unspecified









 Office Visit  2016  9:00a  Main Office  Abisai Glover  Z00.01  
Encounter for



       M.DRose Mary    general adult



           medical exam w



           abnormal



           findings









 J01.00  Acute maxillary sinusitis, unspecified









 Office Visit  2016 10:30a  Main Office  Abisai SAHU  Type 2 holden Glover M.D.    mellitus without



           complications









 I10  Essential (primary) hypertension









 Office Visit  2016  8:45a  Main Office  Abisai SAHU  Type 2 diabetes



       JOSE LUIS Glover    mellitus without



           complications









 M75.111  Incomplete rotatr-cuff tear/ruptr of r shoulder, not trauma









 Office Visit  2015  8:45a  Main Office  Abisai SAHU  Type 2 holden Glover M.D.    mellitus without



           complications









 M25.511  Pain in right shoulder

 

 I10  Essential (primary) hypertension









 Office Visit  2015  9:00a  Main Office  Abisai Glover  V70.0  
Examination



       M.DRose Mary    General Medical



           Routine AT Health



           Care Facility









 250.00  Diabetes Mellitus W/O Compl Type II Or Unspec Controlled

 

 401.9  Hypertension Unspec

 

 272.4  Hyperlipidemia Other Unspec

 

 719.41  Pain Joint Shoulder Region

 

 V76.44  Screening For Malig Spencer Prostate

 

 V76.41  Screening Malignant Neoplasm Rectum

 

 v04.81  Need For Prophylactic Vaccination & Inoculation/Influenza









 Office Visit  2015  9:45a  Main Office  Abisai Glover,  250.00  
Diabetes Mellitus



       M.D.    W/O Compl Type II



           Or Unspec



           Controlled









 401.9  Hypertension Unspec

 

 272.4  Hyperlipidemia Other Unspec

 

 716.39  Arthritis Menopausal (Female) Multiple Sites









 Office Visit  2015  8:45a  Main Office  Abisai Belen,  250.00  
Diabetes Mellitus



       M.D.    W/O Compl Type II



           Or Unspec



           Controlled

 

 Office Visit  2015 11:15a  Main Office  Abisai Glover,  250.00  
Diabetes Mellitus



       M.D.    W/O Compl Type II



           Or Unspec



           Controlled









 401.9  Hypertension Unspec









 Office Visit  2014 11:15a  Main Office  Abisai Glover,  250.00  
Diabetes Mellitus



       M.D.    W/O Compl Type II



           Or Unspec



           Controlled









 401.9  Hypertension Unspec

 

 272.4  Hyperlipidemia Other Unspec

 

 716.39  Arthritis Menopausal (Female) Multiple Sites









 Office Visit  2014 10:00a  Main Office  Abisai Glover,  V70.0  
Examination



       M.D.    General Medical



           Routine AT Health



           Care Facility









 V76.44  Screening For Malig Spencer Prostate

 

 V76.51  Special Screening For Malignant Neoplasms Colon

 

 250.00  Diabetes Mellitus W/O Compl Type II Or Unspec Controlled

 

 401.9  Hypertension Unspec

 

 V76.41  Screening Malignant Neoplasm Rectum









 Office Visit  2014  2:30p  Main Office  Abisai Glover,  250.00  
Diabetes Mellitus



       M.D.    W/O Compl Type II



           Or Unspec



           Controlled









 401.9  Hypertension Unspec









 Office Visit  2013 11:45a  Main Office  Abisai Glover,  250.00  
Diabetes Mellitus



       M.D.    W/O Compl Type II



           Or Unspec



           Controlled









 V04.81  Need For Prophylactic Vaccination & Inoculation/Influenza









 Office Visit  2013  8:15a  Main Office  Abisai Glover,  716.39  
Arthritis



       M.D.    Menopausal



           (Female) Multiple



           Sites









 250.00  Diabetes Mellitus W/O Compl Type II Or Unspec Controlled

 

 401.9  Hypertension Unspec

 

 v70.0  Examination General Medical Routine AT Health Care Facility

 

 v76.41  Screening Malignant Neoplasm Rectum









 Office Visit  05/15/2013  8:15a  Main Office  Abisai Glover,  250.00  
Diabetes Mellitus



       M.D.    W/O Compl Type II



           Or Unspec



           Controlled









 401.9  Hypertension Unspec

 

 272.4  Hyperlipidemia Other Unspec

 

 716.39  Arthritis Menopausal (Female) Multiple Sites









 Office Visit  03/15/2013  8:15a  Main Office  Abisai Glover,  250.00  
Diabetes Mellitus



       M.D.    W/O Compl Type II



           Or Unspec



           Controlled









 401.9  Hypertension Unspec

 

 272.4  Hyperlipidemia Other Unspec









 Office Visit  2012  8:45a  Main Office  Abisai Glover,  250.00  
Diabetes Mellitus



       M.D.    W/O Compl Type II



           Or Unspec



           Controlled









 401.9  Hypertension Unspec

 

 716.39  Arthritis Menopausal (Female) Multiple Sites

 

 V04.81  Need For Prophylactic Vaccination & Inoculation/Influenza









 Office Visit  2012 11:00a  Main Office  Abisai Glover,  250.00  
Diabetes Mellitus



       M.D.    W/O Compl Type II



           Or Unspec



           Controlled









 401.9  Hypertension Unspec

 

 716.39  Arthritis Menopausal (Female) Multiple Sites









 Office Visit  2012  8:00a  Main Office  Abisai Glover,  250.00  
Diabetes Mellitus



       M.D.    W/O Compl Type II



           Or Unspec



           Controlled









 401.9  Hypertension Unspec

 

 716.39  Arthritis Menopausal (Female) Multiple Sites

 

 V70.0  Examination General Medical Routine AT Health Care Facility









 Office Visit  2012  8:15a  Main Office  Abisai Glover,  250.00  
Diabetes Mellitus



       M.D.    W/O Compl Type II



           Or Unspec



           Controlled









 401.9  Hypertension Unspec

 

 716.39  Arthritis Menopausal (Female) Multiple Sites









 Office Visit  2011  8:15a  Main Office  Abisai Glover,  465.9  URI  
Upper



       M.D.    Respiratory



           Infections Acute



           Unspec Sites









 250.00  Diabetes Mellitus W/O Compl Type II Or Unspec Controlled

 

 401.9  Hypertension Unspec

 

 716.19  Arthropathy Traumatic Multiple Sites









 Office Visit  09/15/2011  9:15a  Main Office  Abisai Glover,  250.00  
Diabetes Mellitus



       M.D.    W/O Compl Type II



           Or Unspec



           Controlled









 401.9  Hypertension Unspec









 Office Visit  2011  9:30a  Main Office  Abisai Glover,  250.00  
Diabetes Mellitus



       M.D.    W/O Compl Type II



           Or Unspec



           Controlled









 401.1  Hypertension Benign









 Office Visit  2011  2:30p  Main Office  Abisai Glover,  250.00  
Diabetes Mellitus



       M.D.    W/O Compl Type II



           Or Unspec



           Controlled









 401.1  Hypertension Benign









 Office Visit  2010  3:30p  Main Office  Abisai Glover,  V70.0  
Examination



       M.D.    General Medical



           Routine AT Health



           Care Facility









 V06.1  Diphtheria-Tetanus-Pertusis Combined (DTaP)

 

 V03.82  Streptococcus Pneumoniae Vaccination Spec Other







Plan of Treatment

2018 - Abisai Glover M.D.Z00.01 Encounter for general adult medical 
examination with abnormal [id number] Chronic kidney disease, unspecifiedNew 
Labs:Comprehensive Metabolic Panel, Ordered: 18I10 Essential (primary) 
hypertensionNew Labs:LDL Cholesterol Profile, Ordered: 18E79.0 
Hyperuricemia without signs of inflammatory arthritis and tophaceous diseaseNew 
Labs:Uric Acid, Ordered: 18Z12.12 Encounter for screening for malignant 
neoplasm of rectumReferral:No Doctor [id number] Encounter for screening for 
malignant neoplasm of prostateNew Labs:Prostate Specific Antigen, Ordered: D64.9 Anemia, unspecifiedNew Labs:CBC, Ordered: erritin, Ordered: Iron-Tibc-%Sat, Ordered: 18Vitamin B12 And Folate, Ordered: ollow up:1 month

## 2018-09-25 NOTE — UC
Head Injury HPI





- HPI Summary


HPI Summary: 





73-year-old male comes in to clinic today with a complaint of head injury.  

Just before arrival he was throwing out trash and he tripped and fell while 

putting garbage into a dumpster and struck his left forehead on the dumpster.  

He sustained a laceration.  There was no loss of consciousness he feels well 

other than the laceration.  No vision change, NO weakness numbness.  The wound 

bled quite a bit.  He is not on a blood thinner.  She is on a daily antibiotic 

dicloxacillin for a history of a hip infection.





- History Of Current Complaint


Chief Complaint: UCLaceration


Stated Complaint: WC S/P FALL LEFT EYE INJURY


Time Seen by Provider: 09/25/18 15:20


Pain Intensity: 0





- Allergies/Home Medications


Allergies/Adverse Reactions: 


 Allergies











Allergy/AdvReac Type Severity Reaction Status Date / Time


 


heparin Allergy  Unknown Uncoded 09/25/18 15:07





   Reaction  





   Details  











Home Medications: 


 Home Medications





Allopurinol TAB* [Zyloprim 100 MG TAB*] 100 mg PO DAILY 09/25/18 [History 

Confirmed 09/25/18]


Aspirin [Ecotrin Low Strength] 81 mg PO DAILY 09/25/18 [History Confirmed 09/25/ 18]


Atenolol TAB* [Tenormin TAB* 50 MG] 50 mg PO DAILY 09/25/18 [History Confirmed 

09/25/18]


Dicloxacillin CAP* [Dynapen CAP*] 250 mg PO BID 09/25/18 [History Confirmed 09/ 25/18]


Iron 65 Mg 65 mg DAILY 09/25/18 [History Confirmed 09/25/18]


Sertraline* [Zoloft*] 50 mg PO DAILY 09/25/18 [History Confirmed 09/25/18]


metFORMIN* [Glucophage 500 MG TAB *] 500 mg PO DAILY 09/25/18 [History 

Confirmed 09/25/18]











PMH/Surg Hx/FS Hx/Imm Hx


Previously Healthy: No - HIP REPLACEMENT INFECTION


Endocrine History: Diabetes


Cardiovascular History: Hypertension


Other Cardiovascular History: GOUT





- Surgical History


Surgical History: Yes


Surgery Procedure, Year, and Place: Right hip fracture with metal plates 1/2016 

[facility]





- Family History


Known Family History: Positive: Cardiac Disease


   Negative: Diabetes





- Social History


Alcohol Use: None


Substance Use Type: None


Smoking Status (MU): Never Smoked Tobacco





- Immunization History


Most Recent Tetanus Shot: UTD





Review of Systems


Constitutional: Negative


Skin: Other - See history present illness


Eyes: Negative


ENT: Negative


Respiratory: Negative


Cardiovascular: Negative


Motor: Negative


Neurovascular: Negative


Musculoskeletal: Negative


Neurological: Negative


Psychological: Negative


Is Patient Immunocompromised?: No


All Other Systems Reviewed And Are Negative: Yes





Physical Exam


Triage Information Reviewed: Yes


Appearance: Well-Appearing, No Pain Distress, Well-Nourished


Vital Signs: 


 Initial Vital Signs











Temp  98.8 F   09/25/18 14:45


 


Pulse  98   09/25/18 14:45


 


Resp  20   09/25/18 14:45


 


BP  117/72   09/25/18 14:45


 


Pulse Ox  100   09/25/18 14:45











Vital Signs Reviewed: Yes


Eye Exam: Normal


ENT: Positive: Other - 2 cm laceration in the left eyebrow.  Eyes are full 

range of motion no eye injury.


Neck exam: Normal


Neck: Positive: Supple


Respiratory: Positive: No respiratory distress


Cardiovascular: Positive: RRR


Musculoskeletal Exam: Normal


Musculoskeletal: Positive: Strength Intact, ROM Intact


Neurological Exam: Normal


Neurological: Positive: Alert


Psychological Exam: Normal


Skin: Positive: Other - See history present illness





Procedures





- Laceration/Wound Repair


  ** 1


Location: face


Description: Linear


Anesthesia: Local, 1.0%, Lido


Length, Depth and Shape: 2CM, SC


Betadine Prep?: No - SHURCLENS


Laceration/Wound Explored: clean


Debridement: NONE


Suture Type: Prolene - 6-0


Number of Sutures: 8


Layer Closure?: No


Sterile Dressing Applied?: Yes - ABX OINTMENT APPLIED





Head Injury Course/Dx





- Course


Course Of Treatment: Tolerated procedure well.  Sutures out in 5-7 days.  We 

discussed head injury.  Was no loss of consciousness or neurologic deficit or 

altered mental status.  Patient is not on blood thinner.  We discussed getting 

a head CT however at this time it's not indicated but I left the patient and 

his wife know that if he has any concerning symptoms he needs to get 

reevaluated right away for possible head imaging.  Follow-up primary care 

doctor recheck sooner if worse.





- Differential Dx/Diagnosis


Provider Diagnoses: FACIAL LACERATION.  HEAD INJURY





Discharge





- Sign-Out/Discharge


Documenting (check all that apply): Patient Departure


All imaging exams completed and their final reports reviewed: No Studies





- Discharge Plan


Condition: Stable


Disposition: HOME


Patient Education Materials:  Head Injury (ED), Facial Laceration (ED)


Referrals: 


Abisai Glover MD [Primary Care Provider] - 


Additional Instructions: 


FOLLOW UP WITH YOUR DOCTOR.


SUTURES OUT IN 5-7 DAYS.


KEEP THE WOUND MOIST WITH ANTIBIOTIC OINTMENT TO HELP MINIMIZE SCARRING.


GO TO THE EMERGENCY DEPARTMENT FOR ANY WORSENING OF YOUR CONDITION; HEADACHE, 

WEAKNESS, NUMBNESS, CONFUSION, UNEXPLAINED VOMITING, INFECTION OR QUESTIONS OR 

CONCERNS.





- Billing Disposition and Condition


Condition: STABLE


Disposition: Home





- Attestation Statements


Document Initiated by Scribe: No